# Patient Record
Sex: FEMALE | Race: WHITE | NOT HISPANIC OR LATINO | Employment: OTHER | URBAN - METROPOLITAN AREA
[De-identification: names, ages, dates, MRNs, and addresses within clinical notes are randomized per-mention and may not be internally consistent; named-entity substitution may affect disease eponyms.]

---

## 2023-11-17 ENCOUNTER — TRANSCRIBE ORDERS (OUTPATIENT)
Dept: ADMINISTRATIVE | Facility: HOSPITAL | Age: 88
End: 2023-11-17
Payer: MEDICARE

## 2023-11-17 ENCOUNTER — HOSPITAL ENCOUNTER (OUTPATIENT)
Dept: GENERAL RADIOLOGY | Facility: HOSPITAL | Age: 88
Discharge: HOME | End: 2023-11-17
Admitting: FAMILY MEDICINE
Payer: MEDICARE

## 2023-11-17 DIAGNOSIS — M54.6 ACUTE LEFT-SIDED THORACIC BACK PAIN: Primary | ICD-10-CM

## 2023-11-17 PROCEDURE — 72070 X-RAY EXAM THORAC SPINE 2VWS: CPT

## 2024-07-28 ENCOUNTER — APPOINTMENT (OUTPATIENT)
Dept: MRI IMAGING | Facility: HOSPITAL | Age: 89
End: 2024-07-28
Payer: MEDICARE

## 2024-07-28 ENCOUNTER — APPOINTMENT (OUTPATIENT)
Dept: CARDIOLOGY | Facility: HOSPITAL | Age: 89
End: 2024-07-28
Payer: MEDICARE

## 2024-07-28 ENCOUNTER — HOSPITAL ENCOUNTER (OUTPATIENT)
Facility: HOSPITAL | Age: 89
Setting detail: OBSERVATION
Discharge: HOME | End: 2024-07-29
Attending: FAMILY MEDICINE | Admitting: STUDENT IN AN ORGANIZED HEALTH CARE EDUCATION/TRAINING PROGRAM
Payer: MEDICARE

## 2024-07-28 ENCOUNTER — APPOINTMENT (OUTPATIENT)
Dept: OTHER | Facility: HOSPITAL | Age: 89
End: 2024-07-28
Payer: MEDICARE

## 2024-07-28 DIAGNOSIS — G45.9 TRANSIENT ISCHEMIC ATTACK (TIA): Primary | ICD-10-CM

## 2024-07-28 PROBLEM — R42 DIZZINESS: Status: ACTIVE | Noted: 2024-07-28

## 2024-07-28 PROBLEM — D53.9 MACROCYTIC ANEMIA: Status: ACTIVE | Noted: 2024-07-28

## 2024-07-28 PROBLEM — Z87.81 HISTORY OF FRACTURE OF RIGHT HIP: Status: ACTIVE | Noted: 2024-07-28

## 2024-07-28 PROBLEM — E55.9 VITAMIN D DEFICIENCY: Status: ACTIVE | Noted: 2024-07-28

## 2024-07-28 PROBLEM — I10 PRIMARY HYPERTENSION: Status: ACTIVE | Noted: 2024-07-28

## 2024-07-28 PROBLEM — K21.9 GERD WITHOUT ESOPHAGITIS: Status: ACTIVE | Noted: 2024-07-28

## 2024-07-28 LAB
BH CV ECHO MEAS - AO MAX PG: 2.43 MMHG
BH CV ECHO MEAS - AO MEAN PG: 1 MMHG
BH CV ECHO MEAS - AO ROOT DIAM: 2.3 CM
BH CV ECHO MEAS - AO V2 MAX: 78 CM/SEC
BH CV ECHO MEAS - AO V2 VTI: 19.1 CM
BH CV ECHO MEAS - AVA(I,D): 2.6 CM2
BH CV ECHO MEAS - EDV(CUBED): 59.3 ML
BH CV ECHO MEAS - EDV(MOD-SP2): 45.4 ML
BH CV ECHO MEAS - EDV(MOD-SP4): 68.6 ML
BH CV ECHO MEAS - EF(MOD-BP): 56.3 %
BH CV ECHO MEAS - EF(MOD-SP2): 56.4 %
BH CV ECHO MEAS - EF(MOD-SP4): 56.9 %
BH CV ECHO MEAS - ESV(CUBED): 19.7 ML
BH CV ECHO MEAS - ESV(MOD-SP2): 19.8 ML
BH CV ECHO MEAS - ESV(MOD-SP4): 29.6 ML
BH CV ECHO MEAS - FS: 30.8 %
BH CV ECHO MEAS - IVS/LVPW: 1 CM
BH CV ECHO MEAS - IVSD: 0.9 CM
BH CV ECHO MEAS - LA DIMENSION: 3.1 CM
BH CV ECHO MEAS - LAT PEAK E' VEL: 13.3 CM/SEC
BH CV ECHO MEAS - LV MASS(C)D: 105.3 GRAMS
BH CV ECHO MEAS - LV MAX PG: 2.07 MMHG
BH CV ECHO MEAS - LV MEAN PG: 1 MMHG
BH CV ECHO MEAS - LV V1 MAX: 72 CM/SEC
BH CV ECHO MEAS - LV V1 VTI: 15.9 CM
BH CV ECHO MEAS - LVIDD: 3.9 CM
BH CV ECHO MEAS - LVIDS: 2.7 CM
BH CV ECHO MEAS - LVOT AREA: 3.1 CM2
BH CV ECHO MEAS - LVOT DIAM: 2 CM
BH CV ECHO MEAS - LVPWD: 0.9 CM
BH CV ECHO MEAS - MED PEAK E' VEL: 7.4 CM/SEC
BH CV ECHO MEAS - MV A MAX VEL: 103 CM/SEC
BH CV ECHO MEAS - MV DEC SLOPE: 275 CM/SEC2
BH CV ECHO MEAS - MV DEC TIME: 0.24 SEC
BH CV ECHO MEAS - MV E MAX VEL: 90.3 CM/SEC
BH CV ECHO MEAS - MV E/A: 0.88
BH CV ECHO MEAS - MV MAX PG: 6.2 MMHG
BH CV ECHO MEAS - MV MEAN PG: 2 MMHG
BH CV ECHO MEAS - MV P1/2T: 103.8 MSEC
BH CV ECHO MEAS - MV V2 VTI: 27.3 CM
BH CV ECHO MEAS - MVA(P1/2T): 2.12 CM2
BH CV ECHO MEAS - MVA(VTI): 1.83 CM2
BH CV ECHO MEAS - PA ACC TIME: 0.11 SEC
BH CV ECHO MEAS - SV(LVOT): 50 ML
BH CV ECHO MEAS - SV(MOD-SP2): 25.6 ML
BH CV ECHO MEAS - SV(MOD-SP4): 39 ML
BH CV ECHO MEASUREMENTS AVERAGE E/E' RATIO: 8.72
BH CV XLRA - RV BASE: 3.2 CM
BH CV XLRA - RV LENGTH: 5.9 CM
BH CV XLRA - RV MID: 2.5 CM
BH CV XLRA - TDI S': 10.7 CM/SEC
CHOLEST SERPL-MCNC: 186 MG/DL (ref 0–200)
FOLATE SERPL-MCNC: >20 NG/ML (ref 4.78–24.2)
GLUCOSE BLDC GLUCOMTR-MCNC: 140 MG/DL (ref 70–130)
GLUCOSE BLDC GLUCOMTR-MCNC: 78 MG/DL (ref 70–130)
GLUCOSE BLDC GLUCOMTR-MCNC: 80 MG/DL (ref 70–130)
HBA1C MFR BLD: 5.5 % (ref 4.8–5.6)
HDLC SERPL-MCNC: 62 MG/DL (ref 40–60)
IRON 24H UR-MRATE: 100 MCG/DL (ref 37–145)
IRON SATN MFR SERPL: 33 % (ref 20–50)
LDLC SERPL CALC-MCNC: 113 MG/DL (ref 0–100)
LDLC/HDLC SERPL: 1.82 {RATIO}
LV EF 2D ECHO EST: 60 %
TIBC SERPL-MCNC: 299 MCG/DL (ref 298–536)
TRANSFERRIN SERPL-MCNC: 201 MG/DL (ref 200–360)
TRIGL SERPL-MCNC: 56 MG/DL (ref 0–150)
VIT B12 BLD-MCNC: 309 PG/ML (ref 211–946)
VLDLC SERPL-MCNC: 11 MG/DL (ref 5–40)

## 2024-07-28 PROCEDURE — 84466 ASSAY OF TRANSFERRIN: CPT | Performed by: PEDIATRICS

## 2024-07-28 PROCEDURE — G0378 HOSPITAL OBSERVATION PER HR: HCPCS

## 2024-07-28 PROCEDURE — 83540 ASSAY OF IRON: CPT | Performed by: PEDIATRICS

## 2024-07-28 PROCEDURE — 83036 HEMOGLOBIN GLYCOSYLATED A1C: CPT | Performed by: PEDIATRICS

## 2024-07-28 PROCEDURE — G0379 DIRECT REFER HOSPITAL OBSERV: HCPCS

## 2024-07-28 PROCEDURE — 99204 OFFICE O/P NEW MOD 45 MIN: CPT

## 2024-07-28 PROCEDURE — 82607 VITAMIN B-12: CPT | Performed by: PEDIATRICS

## 2024-07-28 PROCEDURE — 82746 ASSAY OF FOLIC ACID SERUM: CPT | Performed by: PEDIATRICS

## 2024-07-28 PROCEDURE — 93306 TTE W/DOPPLER COMPLETE: CPT | Performed by: INTERNAL MEDICINE

## 2024-07-28 PROCEDURE — 93306 TTE W/DOPPLER COMPLETE: CPT

## 2024-07-28 PROCEDURE — 82948 REAGENT STRIP/BLOOD GLUCOSE: CPT

## 2024-07-28 PROCEDURE — 70551 MRI BRAIN STEM W/O DYE: CPT

## 2024-07-28 PROCEDURE — 80061 LIPID PANEL: CPT | Performed by: PEDIATRICS

## 2024-07-28 PROCEDURE — 92523 SPEECH SOUND LANG COMPREHEN: CPT

## 2024-07-28 PROCEDURE — 99222 1ST HOSP IP/OBS MODERATE 55: CPT | Performed by: PEDIATRICS

## 2024-07-28 RX ORDER — LISINOPRIL 20 MG/1
20 TABLET ORAL DAILY
COMMUNITY
End: 2024-07-29 | Stop reason: HOSPADM

## 2024-07-28 RX ORDER — LATANOPROST 50 UG/ML
1 SOLUTION/ DROPS OPHTHALMIC NIGHTLY
COMMUNITY

## 2024-07-28 RX ORDER — ACETAMINOPHEN 160 MG/5ML
650 SOLUTION ORAL EVERY 4 HOURS PRN
Status: DISCONTINUED | OUTPATIENT
Start: 2024-07-28 | End: 2024-07-29 | Stop reason: HOSPADM

## 2024-07-28 RX ORDER — ASPIRIN 300 MG/1
300 SUPPOSITORY RECTAL DAILY
Status: DISCONTINUED | OUTPATIENT
Start: 2024-07-29 | End: 2024-07-29

## 2024-07-28 RX ORDER — BISACODYL 10 MG
10 SUPPOSITORY, RECTAL RECTAL DAILY PRN
Status: DISCONTINUED | OUTPATIENT
Start: 2024-07-28 | End: 2024-07-29 | Stop reason: HOSPADM

## 2024-07-28 RX ORDER — ASPIRIN 81 MG/1
81 TABLET, CHEWABLE ORAL DAILY
Status: DISCONTINUED | OUTPATIENT
Start: 2024-07-28 | End: 2024-07-28

## 2024-07-28 RX ORDER — ATORVASTATIN CALCIUM 40 MG/1
80 TABLET, FILM COATED ORAL NIGHTLY
Status: DISCONTINUED | OUTPATIENT
Start: 2024-07-28 | End: 2024-07-29 | Stop reason: HOSPADM

## 2024-07-28 RX ORDER — ACETAMINOPHEN 325 MG/1
650 TABLET ORAL EVERY 4 HOURS PRN
Status: DISCONTINUED | OUTPATIENT
Start: 2024-07-28 | End: 2024-07-29 | Stop reason: HOSPADM

## 2024-07-28 RX ORDER — LATANOPROST 50 UG/ML
1 SOLUTION/ DROPS OPHTHALMIC NIGHTLY
Status: DISCONTINUED | OUTPATIENT
Start: 2024-07-28 | End: 2024-07-29 | Stop reason: HOSPADM

## 2024-07-28 RX ORDER — ATORVASTATIN CALCIUM 40 MG/1
80 TABLET, FILM COATED ORAL NIGHTLY
Status: DISCONTINUED | OUTPATIENT
Start: 2024-07-28 | End: 2024-07-28

## 2024-07-28 RX ORDER — SODIUM CHLORIDE 0.9 % (FLUSH) 0.9 %
10 SYRINGE (ML) INJECTION EVERY 12 HOURS SCHEDULED
Status: DISCONTINUED | OUTPATIENT
Start: 2024-07-28 | End: 2024-07-29

## 2024-07-28 RX ORDER — SODIUM CHLORIDE 0.9 % (FLUSH) 0.9 %
10 SYRINGE (ML) INJECTION AS NEEDED
Status: DISCONTINUED | OUTPATIENT
Start: 2024-07-28 | End: 2024-07-29 | Stop reason: HOSPADM

## 2024-07-28 RX ORDER — SODIUM CHLORIDE 9 MG/ML
40 INJECTION, SOLUTION INTRAVENOUS AS NEEDED
Status: DISCONTINUED | OUTPATIENT
Start: 2024-07-28 | End: 2024-07-29 | Stop reason: HOSPADM

## 2024-07-28 RX ORDER — ASPIRIN 81 MG/1
81 TABLET ORAL DAILY
COMMUNITY
End: 2024-07-29 | Stop reason: HOSPADM

## 2024-07-28 RX ORDER — TIMOLOL MALEATE 5 MG/ML
1 SOLUTION/ DROPS OPHTHALMIC DAILY
COMMUNITY

## 2024-07-28 RX ORDER — AMOXICILLIN 250 MG
2 CAPSULE ORAL 2 TIMES DAILY PRN
Status: DISCONTINUED | OUTPATIENT
Start: 2024-07-28 | End: 2024-07-29 | Stop reason: HOSPADM

## 2024-07-28 RX ORDER — ASPIRIN 300 MG/1
300 SUPPOSITORY RECTAL DAILY
Status: DISCONTINUED | OUTPATIENT
Start: 2024-07-28 | End: 2024-07-28

## 2024-07-28 RX ORDER — OMEPRAZOLE 40 MG/1
40 CAPSULE, DELAYED RELEASE ORAL DAILY
COMMUNITY
End: 2024-07-29 | Stop reason: HOSPADM

## 2024-07-28 RX ORDER — BISACODYL 5 MG/1
5 TABLET, DELAYED RELEASE ORAL DAILY PRN
Status: DISCONTINUED | OUTPATIENT
Start: 2024-07-28 | End: 2024-07-29 | Stop reason: HOSPADM

## 2024-07-28 RX ORDER — POLYETHYLENE GLYCOL 3350 17 G/17G
17 POWDER, FOR SOLUTION ORAL DAILY PRN
Status: DISCONTINUED | OUTPATIENT
Start: 2024-07-28 | End: 2024-07-29 | Stop reason: HOSPADM

## 2024-07-28 RX ORDER — ASPIRIN 81 MG/1
81 TABLET, CHEWABLE ORAL DAILY
Status: DISCONTINUED | OUTPATIENT
Start: 2024-07-29 | End: 2024-07-29

## 2024-07-28 RX ORDER — ACETAMINOPHEN 650 MG/1
650 SUPPOSITORY RECTAL EVERY 4 HOURS PRN
Status: DISCONTINUED | OUTPATIENT
Start: 2024-07-28 | End: 2024-07-29 | Stop reason: HOSPADM

## 2024-07-28 RX ORDER — SODIUM CHLORIDE 0.9 % (FLUSH) 0.9 %
10 SYRINGE (ML) INJECTION EVERY 12 HOURS SCHEDULED
Status: DISCONTINUED | OUTPATIENT
Start: 2024-07-28 | End: 2024-07-29 | Stop reason: HOSPADM

## 2024-07-28 RX ADMIN — Medication 10 ML: at 21:06

## 2024-07-28 RX ADMIN — Medication 10 ML: at 10:53

## 2024-07-28 RX ADMIN — ATORVASTATIN CALCIUM 80 MG: 40 TABLET, FILM COATED ORAL at 21:06

## 2024-07-28 RX ADMIN — Medication 10 ML: at 10:54

## 2024-07-28 RX ADMIN — LATANOPROST 1 DROP: 50 SOLUTION OPHTHALMIC at 22:52

## 2024-07-28 NOTE — PLAN OF CARE
Goal Outcome Evaluation:  Plan of Care Reviewed With: patient, family        Progress:  (eval)         Anticipated Discharge Disposition (SLP): home    SLP Diagnosis: functional speech/language skills, functional cognitive-linguistic skills (07/28/24 1202)

## 2024-07-28 NOTE — H&P
[facility] Medicine Services  HISTORY AND PHYSICAL    Patient Name: Wendi Ravi  : 1933  MRN: [id number]  Primary Care Physician: Emily Smith MD  Date of admission: 2024      Subjective   Subjective     Chief Complaint:  dizziness    HPI:  Wendi Ravi is a 89 y.o. female with a history of hypertension and GERD who presents with acute onset of dizziness and lower extremity heaviness.  Patient lives with her son and went to bed feeling well last night.  Woke up around 130 and when she went to get up felt dizzy and actually fell backwards, with no loss of consciousness.  At the time also was having some bilateral heaviness of her legs worse on the left.  Went to [place], provide initial workup, CT scan of the head was negative.  Stroke neurology was consulted and recommended transfer here.    Currently patient is continuing to have some mild dizziness and has had improvement in her lower extremity heaviness.  She otherwise has no other complaints.  She uses a walker at home and is otherwise fairly active  With minimal chronic medical problems.      Personal History     Past Medical History:   Diagnosis Date    Arthritis     Cancer     GERD (gastroesophageal reflux disease)     Hypertension            Past Surgical History:   Procedure Laterality Date    APPENDECTOMY      COLON SURGERY      COLONOSCOPY      EYE SURGERY      FRACTURE SURGERY      HIP FRACTURE SURGERY Right     HYSTERECTOMY      SKIN BIOPSY         Family History: family history includes Cancer in her brother, daughter, mother, sister, and son; Diabetes in her father and sister; Heart disease in her father; No Known Problems in her maternal aunt, maternal grandfather, maternal grandmother, maternal uncle, paternal aunt, paternal grandfather, paternal grandmother, paternal uncle, and another family member.     Social History:  reports that she has never smoked. She has never used smokeless tobacco.  She reports that she does not drink alcohol and does not use drugs.  Social History     Social History Narrative    Lives with son.  Uses walker       Medications:  Available home medication information reviewed.       No Known Allergies    Objective   Objective     Vital Signs:   Temp:  [97.8 °F (36.6 °C)] 97.8 °F (36.6 °C)  Heart Rate:  [64] 64  Resp:  [16] 16  BP: (143-193)/(72-91) 143/72  Total (NIH Stroke Scale): 1    Physical Exam   Constitutional: Awake, alert  Eyes: PERRLA, sclerae anicteric, no conjunctival injection  HENT: NCAT, mucous membranes moist  Neck: Supple, no thyromegaly, no lymphadenopathy, trachea midline  Respiratory: Clear to auscultation bilaterally, nonlabored respirations   Cardiovascular: RRR, no murmurs, rubs, or gallops, palpable pedal pulses bilaterally  Gastrointestinal: Positive bowel sounds, soft, nontender, nondistended  Musculoskeletal: No bilateral ankle edema, no clubbing or cyanosis to extremities  Psychiatric: Appropriate affect, cooperative  Neurologic: Oriented x 3, strength symmetric in all extremities, Cranial Nerves grossly intact to confrontation, speech clear  Skin: No rashes      Result Review:  I have personally reviewed the results from the time of this admission to 7/28/2024 14:33 EDT and agree with these findings:  [x]  Laboratory list / accordion  []  Microbiology  [x]  Radiology  [x]  EKG/Telemetry   []  Cardiology/Vascular   []  Pathology  [x]  Old records  []  Other:        LAB RESULTS:          Lab 07/28/24  1033   HEMOGLOBIN A1C 5.50                 Lab 07/28/24  1032   CHOLESTEROL 186   LDL CHOL 113*   HDL CHOL 62*   TRIGLYCERIDES 56         Lab 07/28/24  1032   IRON 100   IRON SATURATION (TSAT) 33   TIBC 299   TRANSFERRIN 201             Microbiology Results (last 10 days)       ** No results found for the last 240 hours. **            Adult Transthoracic Echo Complete W/ Cont if Necessary Per Protocol (With Agitated Saline)    Result Date: 7/28/2024     Left ventricular systolic function is normal. Estimated left ventricular EF = 60%   No hemodynamically significant valvular heart disease     CT Outside Films    Result Date: 2024  This procedure was auto-finalized with no dictation required.    CT Head Without Contrast    Result Date: 2024  [facility] [address] [place] [address] Phone: [phone] Fax: [phone] Name: GHISLAINE STEVENS Exam Date: 2024 : 1933 Age 89 years Gender: F Accession: [id number] MRN: [id number] Physician: ALMA PATEL Facility: [facility] Facility HSV: Outpatient Exam: CT BRAIN W/O (STROKE PROTOCOL) FINAL REPORT TECHNIQUE: null CLINICAL HISTORY: Numbness Left Side COMPARISON: null FINDINGS: CT BRAIN WITHOUT CONTRAST ß¦COMPARISON: None. ß¦FINDINGS: There is mild parenchymal atrophy. There is no evidence of an acute infarct or intraparenchymal hemorrhage. Remote infarction is noted within the left occipital lobe. Patchy areas of low attenuation are noted in the subcortical and periventricular regions of the supratentorial brain which are nonspecific but most likely represent chronic small vessel ischemic disease. There is no mass effect, midline shift, or extra-axial blood. The ventricles are normal in size without evidence of hydrocephalus. The bone windows are unremarkable. There is opacification of the left sphenoid sinus. IMPRESSION: IMPRESSION: 1. No acute disease in the brain. 2. Left sphenoid sinus disease is noted. Authenticated and Electronically Signed by Cy Melton MD on 2024 04:59:47 AMEASTERN Dictated By:  Cy Melton Transcribed By: Transcribed On: 2024 4:59 AM Electronically signed by: Cy Melton 2024 Thank you for referring GHISLAINE STEVENS to [facility]. Legally authenticated by NOA MEJIAS 2024 04:59:47     Results for orders placed during the hospital encounter of 24    Adult Transthoracic Echo Complete W/ Cont if  Necessary Per Protocol (With Agitated Saline)    Interpretation Summary    Left ventricular systolic function is normal. Estimated left ventricular EF = 60%    No hemodynamically significant valvular heart disease      Assessment & Plan   Assessment & Plan       Dizziness    Primary hypertension    GERD without esophagitis    Macrocytic anemia    History of fracture of right hip    Vitamin D deficiency    Wendi Ravi is a 89 y.o. F with PMHx of HTN, GERD, glaucoma and recent R hip fx in May, presents with acute onset of dizziness with concern for possible stroke.    Vertigo:  --Stroke neurology following.  --MRI pending.  --FLP and A1C WNL  --s/p ASA.  --PT/OT/SLP  --monitor on tele    HTN  --BP elevated, but with permissive hypertension for first 24hrs.    GERD  --cont PPI    Macrocytic anemia  --Send off iron studies, folate, and vit B12 levels    Vit D def:  --noted on old labs.  Repeat level here      VTE Prophylaxis:  Mechanical VTE prophylaxis orders are present.      CODE STATUS:    Code Status and Medical Interventions: CPR (Attempt to Resuscitate); Full Support   Ordered at: 07/28/24 0925     Level Of Support Discussed With:    Patient     Code Status (Patient has no pulse and is not breathing):    CPR (Attempt to Resuscitate)     Medical Interventions (Patient has pulse or is breathing):    Full Support       Expected Discharge   Expected Discharge Date: 7/29/2024; Expected Discharge Time:      Carissa Tee MD  07/28/24

## 2024-07-28 NOTE — CONSULTS
Stroke Consult Note    Patient Name: Wendi Ravi   MRN: [id number]  Age: 89 y.o.  Sex: female  : 1933    Primary Care Physician: Emily Smith MD  Referring Physician: Dave Goldberge MD    TIME STROKE TEAM CALLED: 0506 EST     TIME PATIENT SEEN: 1100 EST    Handedness: Right   Race:     Chief Complaint/Reason for Consultation: Left lower extremity weakness, vertigo, blurry vision    HPI:   This is Ms. Wendi Falcon 89-year-old female with significant health diagnosis for HTN, prior CVA, right femur fracture who presented to [facility] for acute onset of vertigo, blurry vision, left lower extremity weakness.  Patient was not a candidate for IV thrombolytic therapy secondary to last known well 9:30 PM before she went to sleep, woke up at 1:30 AM with symptoms.  CT head without contrast negative for acute abnormality, CTA pending per ED physician images will be uploaded to CD and sent with patient and they will power shared the images with us.  Patient given 325 aspirin prior to her transfer to our facility.  NIH reported as 3 for weakness LLE, bilateral blurry vision, GCS 15, vital stables reported initial blood pressure 202/93 received 1 dose of hydralazine brought her blood pressure down to 169/80, heart rate 65 normal sinus rhythm, respirations 17 satting 92% on room air.    Patient was examined upon arrival to [facility].  NIH 1 on my exam due to partial hemianopia of right upper quadrant visual field.  No current weakness or numbness outside of baseline.  No reported dizziness or headache.  Patient states that at approximately 1:30 AM she sat up from bed very quickly and felt more dizzy at that time slightly lightheaded. She noticed what she thought was left leg weakness when standing soon after, but symptoms have now resolved. Patient was unaware of Prior CVA evidenced on CTH. No additional complaints or concerns.     Last Known Normal Date/Time: 2024 AM EST     Review of  Systems   Constitutional:  Positive for diaphoresis. Negative for fatigue and fever.   HENT:  Negative for nosebleeds, trouble swallowing and voice change.    Eyes:  Positive for visual disturbance. Negative for photophobia.   Respiratory:  Negative for shortness of breath.    Cardiovascular:  Negative for chest pain and palpitations.   Gastrointestinal:  Negative for blood in stool, nausea and vomiting.   Genitourinary: Negative.    Musculoskeletal:  Positive for gait problem.   Neurological:  Positive for dizziness, weakness and light-headedness. Negative for tremors, seizures, syncope, facial asymmetry, speech difficulty, numbness and headaches.   Hematological:  Does not bruise/bleed easily.   Psychiatric/Behavioral: Negative.        No past medical history on file.  No past surgical history on file.  No family history on file.  Social History     Socioeconomic History    Marital status:      Not on File  Prior to Admission medications    Not on File         BP: ()/()   Arterial Line BP: ()/()   Neurological Exam  Mental Status  Alert. Oriented to person, place and time. Oriented to person, place, and time. Speech is normal. Language is fluent with no aphasia.    Cranial Nerves  CN II: Right superior quadrantanopsia. Left normal visual field.  CN III, IV, VI: Extraocular movements intact bilaterally. Normal lids and orbits bilaterally. Pupils equal round and reactive to light bilaterally.  CN V: Facial sensation is normal.  CN VII: Full and symmetric facial movement.  CN XII: Tongue midline without atrophy or fasciculations.    Motor  Normal muscle bulk throughout. No fasciculations present. Normal muscle tone.  At least 4 out of 5 strength to all extremities.  No drift appreciated.    Sensory  Light touch is normal in upper and lower extremities.     Coordination  Right: Finger-to-nose normal.Left: Finger-to-nose normal.    Gait    Not observed.      Physical Exam  HENT:      Head: Normocephalic and  atraumatic.      Mouth/Throat:      Pharynx: Oropharynx is clear.   Eyes:      General: Lids are normal.      Extraocular Movements: Extraocular movements intact.      Pupils: Pupils are equal, round, and reactive to light.   Cardiovascular:      Rate and Rhythm: Normal rate.   Pulmonary:      Effort: Pulmonary effort is normal. No respiratory distress.   Musculoskeletal:      Right lower leg: No edema.      Left lower leg: No edema.   Skin:     General: Skin is warm.      Findings: No lesion.   Neurological:      Mental Status: She is alert and oriented to person, place, and time.      Cranial Nerves: Cranial nerve deficit present.      Sensory: No sensory deficit.      Motor: No weakness.      Coordination: Coordination normal.   Psychiatric:         Mood and Affect: Mood normal.         Speech: Speech normal.         Acute Stroke Data    Thrombolytic Inclusion / Exclusion Criteria    Time: 06:27 EDT  Person Administering Scale: DAVID Sanz    Inclusion Criteria  [x]   18 years of age or greater   []   Onset of symptoms < 4.5 hours before beginning treatment (stroke onset = time patient was last seen well or without symptoms).   []   Diagnosis of acute ischemic stroke causing measurable disabling deficit (Complete Hemianopia, Any Aphasia, Visual or Sensory Extinction, Any weakness limiting sustained effort against gravity)   []   Any remaining deficit considered potentially disabling in view of patient and practitioner   Exclusion criteria (Do not proceed with Alteplase if any are checked under exclusion criteria)  [x]   Onset unknown or GREATER than 4.5 hours   []   ICH on CT/MRI   []   CT demonstrates hypodensity representing acute or subacute infarct   []   Significant head trauma or prior stroke in the previous 3 months   []   Symptoms suggestive of subarachnoid hemorrhage   []   History of un-ruptured intracranial aneurysm GREATER than 10 mm   []   Recent intracranial or intraspinal surgery  within the last 3 months   []   Arterial puncture at a non-compressible site in the previous 7 days   []   Active internal bleeding   []   Acute bleeding tendency   []   Platelet count LESS than 100,000 for known hematological diseases such as leukemia, thrombocytopenia or chronic cirrhosis   []   Current use of anticoagulant with INR GREATER than 1.7 or PT GREATER than 15 seconds, aPTT GREATER than 40 seconds   []   Heparin received within 48 hours, resulting in abnormally elevated aPTT GREATER than upper limit of normal   []   Current use of direct thrombin inhibitors or direct factor Xa inhibitors in the past 48 hours   []   Elevated blood pressure refractory to treatment (systolic GREATER than 185 mm/Hg or diastolic  GREATER than 110 mm/Hg   []   Suspected infective endocarditis and aortic arch dissection   []   Current use of therapeutic treatment dose of low-molecular-weight heparin (LMWH) within the previous 24 hours   []   Structural GI malignancy or bleed   Relative exclusion for all patients  []   Only minor non-disabling symptoms   []   Pregnancy   []   Seizure at onset with postictal residual neurological impairments   []   Major surgery or previous trauma within past 14 days   []   History of previous spontaneous ICH, intracranial neoplasm, or AV malformation   []   Postpartum (within previous 14 days)   []   Recent GI or urinary tract hemorrhage (within previous 21 days)   []   Recent acute MI (within previous 3 months)   []   History of un-ruptured intracranial aneurysm LESS than 10 mm   []   History of ruptured intracranial aneurysm   []   Blood glucose LESS than 50 mg/dL (2.7 mmol/L)   []   Dural puncture within the last 7 days   []   Known GREATER than 10 cerebral microbleeds   Additional exclusions for patients with symptoms onset between 3 and 4.5 hours.  []   Age > 80.   []   On any anticoagulants regardless of INR  >>> Warfarin (Coumadin), Heparin, Enoxaparin (Lovenox), fondaparinux (Arixtra),  bivalirudin (Angiomax), Argatroban, dabigatran (Pradaxa), rivaroxaban (Xarelto), or apixaban (Eliquis)   []   Severe stroke (NIHSS > 25).   []   History of BOTH diabetes and previous ischemic stroke.   []   The risks and benefits have been discussed with the patient or family related to the administration of IV thrombolytic therapy for stroke symptoms.   []   I have discussed and reviewed the patient's case and imaging with the attending prior to IV thrombolytic therapy.   na Time IV thrombolytic administered       Hospital Meds:  Scheduled- sodium chloride, 10 mL, Intravenous, Q12H      Infusions-     PRNs-   sodium chloride    sodium chloride    Functional Status Prior to Current Stroke/Avery Score:   MODIFIED TIFFANY SCALE (to be assessed for each patient having history of stroke) []Stroke history but not assessed  []0: No symptoms at all  []1: No significant disability despite symptoms  [x]2: Slight disability  []3: Moderate disability  []4: Moderately severe disability  []5: Severe disability  []6: Death        NIH Stroke Scale  Time: 06:27 EDT  Person Administering Scale: DAVID Sanz    1a  Level of consciousness: 0=alert; keenly responsive   1b. LOC questions:  0=Answers both questions correctly   1c. LOC commands: 0=Performs both tasks correctly   2.  Best Gaze: 0=normal   3.  Visual: 1=Partial hemianopia   4. Facial Palsy: 0=Normal symmetric movement   5a.  Motor left arm: 0=No drift, limb holds 90 (or 45) degrees for full 10 seconds   5b.  Motor right arm: 0=No drift, limb holds 90 (or 45) degrees for full 10 seconds   6a. motor left le=No drift, limb holds 90 (or 45) degrees for full 10 seconds   6b  Motor right le=No drift, limb holds 90 (or 45) degrees for full 10 seconds   7. Limb Ataxia: 0=Absent   8.  Sensory: 0=Normal; no sensory loss   9. Best Language:  0=No aphasia, normal   10. Dysarthria: 0=Normal   11. Extinction and Inattention: 0=No abnormality    Total:   1     CBC  w/diff          5/2/2024    03:55 5/3/2024    05:29 5/5/2024    01:08   CBC w/Diff   WBC 8.98     7.65     7.18       RBC 3.29     2.95     2.73       Hemoglobin 10.6     9.7     8.9       Hematocrit 32.8     30.0     27.5            102     101       MCH 32.2     32.9     32.6       MCHC 32.3     32.3     32.4       RDW 14.0     14.1     14.2       Platelets 140     141     162       Neutrophil Rel % 84.0     67.0     51.0       Immature Granulocyte Rel % 0.0     1.0     1.0       Lymphocyte Rel % 10.0     21.0     29.0       Monocyte Rel % 6.0     8.0     13.0       Eosinophil Rel % 0.0     2.0     5.0       Basophil Rel % 0.0     1.0     1.0         MRI Brain Without Contrast    Result Date: 7/28/2024  Impression: 1. No acute intracranial abnormality. 2. Mild age-related atrophy with presumed sequela of microvascular ischemic changes. Remote left occipital infarct. 3. T1 hyperintense/T2 hypointense opacification of the left sphenoid sinus which may represent inspissated secretions or fungal colonization. Electronically Signed: Inge Gates MD  7/28/2024 2:57 PM EDT  Workstation ID: [id number]      Results Reviewed:  I have personally reviewed current lab, radiology, and data and agree with results.      Assessment/Plan:    This is a 89-year-old white female, right-handed with multiple vascular risk factor who presented to [facility] for acute onsets of left lower extremity weakness , vertigo, vision impairment at 130 this morning was a last known well 9:30 PM 7/27/2024.  Patient is not a candidate for IV thrombolytic therapy secondary to outside the window.  Patient is not a candidate for mechanical thrombectomy    Antiplatelet PTA: None  Anticoagulant PTA: None        # Right superior quadrant visual deficit   # Remote Left Occipital Infarct   # Transient episode of vertigo with left lower extremity weakness  Ddx: TIA\ischemic stroke, stroke recrudescence, metabolic encephalopathy  -TIA\ischemic  without IV thrombolytic therapy stroke order set in place  -CT head, CTA images at the outside hospital pending will upload images  -MRI ordered and pending  -Echo ordered and pending  -A1c and lipid profile ordered and pending  -Patient received 325 aspirin at OSH, continue aspirin 81 mg starting on 7/29/2024  -Systolic blood pressure allow gradual normalization, SBP goals less than 180, avoid hypotension to avoid hypoperfusion   -PT\OT\SLP evaluation  -Case management for final discharge planning  -NIH\neurocheck per protocol  -Patient remain n.p.o. till dysphagia screen passed, if passed okay to start cardiac diet  -Neurology stroke will continue to follow-up    I discussed plan of care with patient, family, outside ED physician, report to Dr. Tarango hospital medicine team.  Neurology stroke will continue to follow-up.  Thank you for letting us participate in patient care.    Geraldo Thrasher PA-C   [facility] Neuro Stroke

## 2024-07-28 NOTE — THERAPY EVALUATION
Acute Care - Speech Language Pathology Initial Evaluation  [facility]  Cognitive-Communication Evaluation       Patient Name: Wendi Ravi  : 1933  MRN: [id number]  Today's Date: 2024               Admit Date: 2024     Visit Dx:  No diagnosis found.  Patient Active Problem List   Diagnosis    Dizziness    Primary hypertension    GERD without esophagitis    Macrocytic anemia    History of fracture of right hip    Vitamin D deficiency     Past Medical History:   Diagnosis Date    Arthritis     Cancer     GERD (gastroesophageal reflux disease)     Hypertension      Past Surgical History:   Procedure Laterality Date    APPENDECTOMY      COLON SURGERY      COLONOSCOPY      EYE SURGERY      FRACTURE SURGERY      HIP FRACTURE SURGERY Right     HYSTERECTOMY      SKIN BIOPSY         SLP Recommendation and Plan  SLP Diagnosis: functional speech/language skills, functional cognitive-linguistic skills (24 1330)              [facility] [place] Criteria for Skilled Therapy Interventions Met: baseline status (24 1330)  Anticipated Discharge Disposition (SLP): home (24 1330)        Therapy Frequency (SLP SLC): evaluation only (24 1330)                                Plan of Care Reviewed With: patient, family (24 1356)  Progress:  (eval) (24 1356)      SLP EVALUATION (Last 72 Hours)       SLP SLC Evaluation       Row Name 24 8381                   Communication Assessment/Intervention    Document Type evaluation  -AV        Subjective Information no complaints  -AV        Patient Observations alert;cooperative  -AV        Patient/Family/Caregiver Comments/Observations family  -AV        Patient Effort good  -AV           General Information    Patient Profile Reviewed yes  -AV        Precautions/Limitations, Vision WFL  -AV        Precautions/Limitations, Hearing WFL  -AV        Prior Level of Function-Communication WFL  -AV        Plans/Goals Discussed with patient;family  -AV         Barriers to Rehab none identified  -AV        Patient's Goals for Discharge return to home  -AV        Family Goals for Discharge patient able to return to previous activities/roles  -AV           Pain    Additional Documentation Pain Scale: FACES Pre/Post-Treatment (Group)  -AV           Pain Scale: FACES Pre/Post-Treatment    Pain: FACES Scale, Pretreatment 0-->no hurt  -AV        Posttreatment Pain Rating 0-->no hurt  -AV           Comprehension Assessment/Intervention    Comprehension Assessment/Intervention Auditory Comprehension;Reading Comprehension  -AV           Auditory Comprehension Assessment/Intervention    Auditory Comprehension (Communication) WFL  -AV           Reading Comprehension Assessment/Intervention    Reading Comprehension (Communication) WFL  -AV           Expression Assessment/Intervention    Expression Assessment/Intervention verbal expression;graphic expression  -AV           Verbal Expression Assessment/Intervention    Verbal Expression WFL  -AV           Graphic Expression Assessment/Intervention    Graphic Expression WFL  -AV           Oral Motor Structure and Function    Oral Motor Structure and Function WF  -AV        Dentition Assessment natural, present and adequate  -AV        Mucosal Quality moist, healthy  -AV           Oral Musculature and Cranial Nerve Assessment    Oral Motor General Assessment WFL  -AV           Motor Speech Assessment/Intervention    Motor Speech Function [facility]  -AV           Cursory Voice Assessment/Intervention    Quality and Resonance (Voice) WFL  -AV           Cognitive Assessment Intervention- SLP    Cognitive Function (Cognition) [facility]  -AV           SLP Evaluation Clinical Impressions    SLP Diagnosis functional speech/language skills;functional cognitive-linguistic skills  -AV        Rehab Potential/Prognosis good  -AV        SLC Criteria for Skilled Therapy Interventions Met baseline status  -AV        Functional Impact no impact on function  -AV            Recommendations    Therapy Frequency (SLP SLC) evaluation only  -AV        Anticipated Discharge Disposition (SLP) home  -AV                  User Key  (r) = Recorded By, (t) = Taken By, (c) = Cosigned By      Initials Name Effective Dates    Alia Sandy MS CCC-SLP 06/16/21 -                        EDUCATION  The patient has been educated in the following areas:     Cognitive Impairment Communication Impairment.                        Time Calculation:      Time Calculation- SLP       Row Name 07/28/24 1357             Time Calculation- SLP    SLP Start Time 1330  -AV      SLP Received On 07/28/24  -AV         Untimed Charges    SLP Eval/Re-eval  ST Eval Speech and Production w/ Language - 02920  -AV      [id number] Eval Speech and Production w/ Language Minutes 38  -AV         Total Minutes    Untimed Charges Total Minutes 38  -AV       Total Minutes 38  -AV                User Key  (r) = Recorded By, (t) = Taken By, (c) = Cosigned By      Initials Name Provider Type    Alia Sandy MS CCC-SLP Speech and Language Pathologist                    Therapy Charges for Today       Code Description Service Date Service Provider Modifiers Qty    [id number] HC ST EVAL SPEECH AND PROD W LANG  3 7/28/2024 Alia Kerr MS CCC-SLP GN 1                       Alia Nunez MS CCC-SLP  7/28/2024

## 2024-07-29 VITALS
DIASTOLIC BLOOD PRESSURE: 77 MMHG | RESPIRATION RATE: 16 BRPM | BODY MASS INDEX: 19.81 KG/M2 | SYSTOLIC BLOOD PRESSURE: 120 MMHG | HEART RATE: 89 BPM | TEMPERATURE: 98.8 F | WEIGHT: 100.9 LBS | HEIGHT: 60 IN | OXYGEN SATURATION: 97 %

## 2024-07-29 PROBLEM — R42 DIZZINESS: Status: RESOLVED | Noted: 2024-07-28 | Resolved: 2024-07-29

## 2024-07-29 LAB
25(OH)D3 SERPL-MCNC: 34.8 NG/ML (ref 30–100)
ANION GAP SERPL CALCULATED.3IONS-SCNC: 6 MMOL/L (ref 5–15)
BASOPHILS # BLD AUTO: 0.04 10*3/MM3 (ref 0–0.2)
BASOPHILS NFR BLD AUTO: 0.6 % (ref 0–1.5)
BUN SERPL-MCNC: 26 MG/DL (ref 8–23)
BUN/CREAT SERPL: 26.8 (ref 7–25)
CALCIUM SPEC-SCNC: 8.7 MG/DL (ref 8.2–9.6)
CHLORIDE SERPL-SCNC: 106 MMOL/L (ref 98–107)
CO2 SERPL-SCNC: 28 MMOL/L (ref 22–29)
CREAT SERPL-MCNC: 0.97 MG/DL (ref 0.57–1)
DEPRECATED RDW RBC AUTO: 48.3 FL (ref 37–54)
EGFRCR SERPLBLD CKD-EPI 2021: 55.3 ML/MIN/1.73
EOSINOPHIL # BLD AUTO: 0.17 10*3/MM3 (ref 0–0.4)
EOSINOPHIL NFR BLD AUTO: 2.7 % (ref 0.3–6.2)
ERYTHROCYTE [DISTWIDTH] IN BLOOD BY AUTOMATED COUNT: 13.1 % (ref 12.3–15.4)
GLUCOSE SERPL-MCNC: 92 MG/DL (ref 65–99)
HCT VFR BLD AUTO: 36.2 % (ref 34–46.6)
HGB BLD-MCNC: 11.7 G/DL (ref 12–15.9)
IMM GRANULOCYTES # BLD AUTO: 0.02 10*3/MM3 (ref 0–0.05)
IMM GRANULOCYTES NFR BLD AUTO: 0.3 % (ref 0–0.5)
LYMPHOCYTES # BLD AUTO: 1.61 10*3/MM3 (ref 0.7–3.1)
LYMPHOCYTES NFR BLD AUTO: 25.4 % (ref 19.6–45.3)
MCH RBC QN AUTO: 32.5 PG (ref 26.6–33)
MCHC RBC AUTO-ENTMCNC: 32.3 G/DL (ref 31.5–35.7)
MCV RBC AUTO: 100.6 FL (ref 79–97)
MONOCYTES # BLD AUTO: 0.62 10*3/MM3 (ref 0.1–0.9)
MONOCYTES NFR BLD AUTO: 9.8 % (ref 5–12)
NEUTROPHILS NFR BLD AUTO: 3.89 10*3/MM3 (ref 1.7–7)
NEUTROPHILS NFR BLD AUTO: 61.2 % (ref 42.7–76)
NRBC BLD AUTO-RTO: 0 /100 WBC (ref 0–0.2)
PLATELET # BLD AUTO: 182 10*3/MM3 (ref 140–450)
PMV BLD AUTO: 10.5 FL (ref 6–12)
POTASSIUM SERPL-SCNC: 4.5 MMOL/L (ref 3.5–5.2)
RBC # BLD AUTO: 3.6 10*6/MM3 (ref 3.77–5.28)
SODIUM SERPL-SCNC: 140 MMOL/L (ref 136–145)
WBC NRBC COR # BLD AUTO: 6.35 10*3/MM3 (ref 3.4–10.8)

## 2024-07-29 PROCEDURE — 80048 BASIC METABOLIC PNL TOTAL CA: CPT | Performed by: PEDIATRICS

## 2024-07-29 PROCEDURE — 99214 OFFICE O/P EST MOD 30 MIN: CPT | Performed by: STUDENT IN AN ORGANIZED HEALTH CARE EDUCATION/TRAINING PROGRAM

## 2024-07-29 PROCEDURE — 97161 PT EVAL LOW COMPLEX 20 MIN: CPT

## 2024-07-29 PROCEDURE — 85025 COMPLETE CBC W/AUTO DIFF WBC: CPT | Performed by: PEDIATRICS

## 2024-07-29 PROCEDURE — 97165 OT EVAL LOW COMPLEX 30 MIN: CPT

## 2024-07-29 PROCEDURE — 82306 VITAMIN D 25 HYDROXY: CPT | Performed by: PEDIATRICS

## 2024-07-29 PROCEDURE — 99239 HOSP IP/OBS DSCHRG MGMT >30: CPT | Performed by: STUDENT IN AN ORGANIZED HEALTH CARE EDUCATION/TRAINING PROGRAM

## 2024-07-29 PROCEDURE — G0378 HOSPITAL OBSERVATION PER HR: HCPCS

## 2024-07-29 RX ORDER — ATORVASTATIN CALCIUM 80 MG/1
80 TABLET, FILM COATED ORAL NIGHTLY
Qty: 90 TABLET | Refills: 0 | Status: SHIPPED | OUTPATIENT
Start: 2024-07-29

## 2024-07-29 RX ORDER — ASPIRIN 325 MG
325 TABLET, DELAYED RELEASE (ENTERIC COATED) ORAL DAILY
Qty: 90 TABLET | Refills: 0 | Status: SHIPPED | OUTPATIENT
Start: 2024-07-30

## 2024-07-29 RX ORDER — TIMOLOL MALEATE 5 MG/ML
1 SOLUTION/ DROPS OPHTHALMIC DAILY
Status: DISCONTINUED | OUTPATIENT
Start: 2024-07-29 | End: 2024-07-29 | Stop reason: HOSPADM

## 2024-07-29 RX ORDER — ASPIRIN 81 MG/1
324 TABLET, CHEWABLE ORAL DAILY
Status: DISCONTINUED | OUTPATIENT
Start: 2024-07-30 | End: 2024-07-29

## 2024-07-29 RX ORDER — ASPIRIN 325 MG
325 TABLET, DELAYED RELEASE (ENTERIC COATED) ORAL DAILY
Status: DISCONTINUED | OUTPATIENT
Start: 2024-07-29 | End: 2024-07-29 | Stop reason: HOSPADM

## 2024-07-29 RX ADMIN — Medication 10 ML: at 09:19

## 2024-07-29 RX ADMIN — ASPIRIN 325 MG: 325 TABLET, COATED ORAL at 10:02

## 2024-07-29 NOTE — THERAPY EVALUATION
Patient Name: Wendi Ravi  : 1933    MRN: [id number]                              Today's Date: 2024       Admit Date: 2024    Visit Dx:     ICD-10-CM ICD-9-CM   1. Transient ischemic attack (TIA)  G45.9 435.9     Patient Active Problem List   Diagnosis    Dizziness    Primary hypertension    GERD without esophagitis    Macrocytic anemia    History of fracture of right hip    Vitamin D deficiency     Past Medical History:   Diagnosis Date    Arthritis     Cancer     GERD (gastroesophageal reflux disease)     Hypertension      Past Surgical History:   Procedure Laterality Date    APPENDECTOMY      COLON SURGERY      COLONOSCOPY      EYE SURGERY      FRACTURE SURGERY      HIP FRACTURE SURGERY Right     HYSTERECTOMY      SKIN BIOPSY        General Information       Row Name 24 1013          Physical Therapy Time and Intention    Document Type evaluation (P)   -     Mode of Treatment physical therapy (P)   -       Row Name 24 1013          General Information    Patient Profile Reviewed yes (P)   -     Prior Level of Function independent:;all household mobility;community mobility;ADL's;bed mobility (P)   Pt uses rolling walker at baseline. Pt has walk in shower, with grab bars, and shower chair.  -     Existing Precautions/Restrictions fall (P)   -     Barriers to Rehab none identified (P)   -       Row Name 24 1013          Home Main Entrance    Number of Stairs, Main Entrance one;other (see comments) (P)   Ramp to enter with on step at doorway  -     Stair Railings, Main Entrance none (P)   -       Row Name 24 1013          Stairs Within Home, Primary    Number of Stairs, Within Home, Primary none (P)   -       Row Name 24 1013          Cognition    Orientation Status (Cognition) oriented x 4 (P)   -       Row Name 24 1013          Safety Issues, Functional Mobility    Safety Issues Affecting Function (Mobility) awareness of need for  assistance;insight into deficits/self-awareness (P)   -     Impairments Affecting Function (Mobility) balance;endurance/activity tolerance (P)   -     Comment, Safety Issues/Impairments (Mobility) Alert and following commands (P)   -               User Key  (r) = Recorded By, (t) = Taken By, (c) = Cosigned By      Initials Name Provider Type     William Welch, PT Student PT Student                   Mobility       Row Name 07/29/24 1018          Bed Mobility    Bed Mobility supine-sit;scooting/bridging (P)   -HM     Scooting/Bridging [place] (Bed Mobility) standby assist (P)   -     Supine-Sit [place] (Bed Mobility) standby assist (P)   -     Assistive Device (Bed Mobility) head of bed elevated (P)   -     Comment, (Bed Mobility) Pt able to complete bed mobility without physical assistance or increased time to complete task. No reports of dizziness upon sitting. (P)   -       Row Name 07/29/24 1018          Transfers    Comment, (Transfers) STS x 1 from EOB (P)   -       Row Name 07/29/24 1018          Bed-Chair Transfer    Bed-Chair [place] (Transfers) not tested (P)   -       Row Name 07/29/24 1018          Sit-Stand Transfer    Sit-Stand [place] (Transfers) standby assist (P)   -     Assistive Device (Sit-Stand Transfers) walker, front-wheeled (P)   -     Comment, (Sit-Stand Transfer) Pt pushed frombed to FWW without VC for correction (P)   -       Row Name 07/29/24 1018          Gait/Stairs (Locomotion)    [place] Level (Gait) standby assist (P)   -     Assistive Device (Gait) walker, front-wheeled (P)   -HM     Distance in Feet (Gait) 100 (P)   -HM     Deviations/Abnormal Patterns (Gait) bilateral deviations (P)   -HM     Bilateral Gait Deviations heel strike decreased;forward flexed posture (P)   -     [place] Level (Stairs) stand by assist (P)   -     Assistive Device (Stairs) walker, front-wheeled (P)   -HM     Handrail Location (Stairs) none  (P)   -HM     Number of Steps (Stairs) 1 (P)   -     Ascending Technique (Stairs) step-to-step (P)   -     Descending Technique (Stairs) step-to-step (P)   -     Comment, (Gait/Stairs) Pt ambulated with a step through gait pattern. Pt HR increased to 144 with ambulation but returned to 100 once sitting back in room. Pt with no LOB or postural sway observed. Pt was able to complete stair training simulated to home environment with backwards approach. (P)   -               User Key  (r) = Recorded By, (t) = Taken By, (c) = Cosigned By      Initials Name Provider Type     William Welch, PT Student PT Student                   Obj/Interventions       Row Name 07/29/24 1023          Range of Motion Comprehensive    General Range of Motion bilateral lower extremity ROM WFL (P)   -       Row Name 07/29/24 1023          Strength Comprehensive (MMT)    General Manual Muscle Testing (MMT) Assessment lower extremity strength deficits identified (P)   -     Comment, General Manual Muscle Testing (MMT) Assessment B LE 4+/5 (P)   -       Row Name 07/29/24 1023          Balance    Balance Assessment sitting static balance;sitting dynamic balance;sit to stand dynamic balance;standing static balance;standing dynamic balance (P)   -     Static Sitting Balance independent (P)   -     Dynamic Sitting Balance independent (P)   -     Position, Sitting Balance unsupported;sitting edge of bed (P)   -     Sit to Stand Dynamic Balance standby assist (P)   -     Static Standing Balance standby assist (P)   -     Dynamic Standing Balance standby assist (P)   -     Position/Device Used, Standing Balance walker, front-wheeled (P)   -     Balance Interventions sitting;sit to stand;standing;occupation based/functional task (P)   -     Comment, Balance No overt LOB (P)   -       Row Name 07/29/24 1023          Sensory Assessment (Somatosensory)    Sensory Assessment (Somatosensory) LE sensation intact (P)    -HM               User Key  (r) = Recorded By, (t) = Taken By, (c) = Cosigned By      Initials Name Provider Type     William Welch, PT Student PT Student                   Goals/Plan       Row Name 07/29/24 1027          Bed Mobility Goal 1 (PT)    Activity/Assistive Device (Bed Mobility Goal 1, PT) sit to supine/supine to sit (P)   -HM     [place] Level/Cues Needed (Bed Mobility Goal 1, PT) independent (P)   -HM     Time Frame (Bed Mobility Goal 1, PT) short term goal (STG);3 days (P)   -HM     Progress/Outcomes (Bed Mobility Goal 1, PT) new goal (P)   -       Row Name 07/29/24 1027          Transfer Goal 1 (PT)    Activity/Assistive Device (Transfer Goal 1, PT) sit-to-stand/stand-to-sit;bed-to-chair/chair-to-bed (P)   -HM     [place] Level/Cues Needed (Transfer Goal 1, PT) modified independence (P)   -HM     Time Frame (Transfer Goal 1, PT) long term goal (LTG);10 days (P)   -HM     Progress/Outcome (Transfer Goal 1, PT) new goal (P)   -HM       Row Name 07/29/24 1027          Gait Training Goal 1 (PT)    Activity/Assistive Device (Gait Training Goal 1, PT) gait (walking locomotion);assistive device use (P)   -HM     [place] Level (Gait Training Goal 1, PT) modified independence (P)   -HM     Distance (Gait Training Goal 1, PT) 350' (P)   -HM     Time Frame (Gait Training Goal 1, PT) long term goal (LTG);10 days (P)   -HM     Progress/Outcome (Gait Training Goal 1, PT) new goal (P)   -HM       Row Name 07/29/24 1027          Stairs Goal 1 (PT)    Activity/Assistive Device (Stairs Goal 1, PT) stairs, all skills;ascending stairs;descending stairs;assistive device use (P)   -HM     [place] Level/Cues Needed (Stairs Goal 1, PT) modified independence (P)   -HM     Number of Stairs (Stairs Goal 1, PT) 1 (P)   -HM     Progress/Outcome (Stairs Goal 1, PT) new goal (P)   -HM       Row Name 07/29/24 1027          Therapy Assessment/Plan (PT)    Planned Therapy Interventions (PT) balance  training;bed mobility training;gait training;home exercise program;joint mobilization;lumbar stabilization;manual therapy techniques;motor coordination training;stretching;strengthening;stair training;ROM (range of motion);postural re-education;patient/family education;neuromuscular re-education;transfer training (P)   -               User Key  (r) = Recorded By, (t) = Taken By, (c) = Cosigned By      Initials Name Provider Type     William Welch, PT Student PT Student                   Clinical Impression       Row Name 07/29/24 1025          Pain    Pretreatment Pain Rating 0/10 - no pain (P)   -     Posttreatment Pain Rating 0/10 - no pain (P)   -     Pain Intervention(s) Repositioned;Elevated;Ambulation/increased activity (P)   -[facility] Name 07/29/24 1025          Plan of Care Review    Plan of Care Reviewed With patient;family (P)   -     Outcome Evaluation Pt presents with mobility near baseline, however, pt would continue to benefit from skilled therapy services during admission to prevent functional decline. Pt able to ambulate 100' with FWW and SBA with ability to complete stair training simulated to home environment. PT recommended DC to home with assist. (P)   -       Row Name 07/29/24 1025          Therapy Assessment/Plan (PT)    Patient/Family Therapy Goals Statement (PT) go home (P)   -     Rehab Potential (PT) good, to achieve stated therapy goals (P)   -     Criteria for Skilled Interventions Met (PT) yes;skilled treatment is necessary;meets criteria (P)   -     Therapy Frequency (PT) daily (P)   -     Predicted Duration of Therapy Intervention (PT) 2 days (P)   -       Row Name 07/29/24 1025          Vital Signs    Pre Systolic BP Rehab 129 (P)   -HM     Pre Treatment Diastolic BP 84 (P)   -HM     Post Systolic BP Rehab 142 (P)   -HM     Post Treatment Diastolic BP 77 (P)   -     Pretreatment Heart Rate (beats/min) 97 (P)   -     Intratreatment Heart Rate  (beats/min) 144 (P)   -HM     Posttreatment Heart Rate (beats/min) 100 (P)   -HM     Pre SpO2 (%) 94 (P)   -HM     O2 Delivery Pre Treatment room air (P)   -HM     Post SpO2 (%) 98 (P)   -HM     O2 Delivery Post Treatment room air (P)   -HM     Pre Patient Position Supine (P)   -HM     Intra Patient Position Standing (P)   -HM     Post Patient Position Sitting (P)   -       Row Name 07/29/24 1025          Positioning and Restraints    Pre-Treatment Position in bed (P)   -HM     Post Treatment Position chair (P)   -HM     In Chair notified nsg;reclined;call light within reach;encouraged to call for assist;exit alarm on;with family/caregiver;waffle cushion;legs elevated (P)   -HM               User Key  (r) = Recorded By, (t) = Taken By, (c) = Cosigned By      Initials Name Provider Type     William Welch, PT Student PT Student                   Outcome Measures       Row Name 07/29/24 1028          How much help from another person do you currently need...    Turning from your back to your side while in flat bed without using bedrails? 4 (P)   -HM     Moving from lying on back to sitting on the side of a flat bed without bedrails? 4 (P)   -HM     Moving to and from a bed to a chair (including a wheelchair)? 4 (P)   -HM     Standing up from a chair using your arms (e.g., wheelchair, bedside chair)? 4 (P)   -HM     Climbing 3-5 steps with a railing? 3 (P)   -HM     To walk in hospital room? 4 (P)   -HM     AM-PAC 6 Clicks Score (PT) 23 (P)   -HM     Highest Level of Mobility Goal 7 --> Walk 25 feet or more (P)   -       Row Name 07/29/24 1028          Modified [place] Scale    Pre-Stroke Modified [place] Scale 6 - Unable to determine (UTD) from the medical record documentation (P)   -HM     Modified [place] Scale 0 - No Symptoms at all. (P)   -HM       Row Name 07/29/24 1028          Functional Assessment    Outcome Measure Options AM-PAC 6 Clicks Basic Mobility (PT);Modified [place] (P)   -               User  Key  (r) = Recorded By, (t) = Taken By, (c) = Cosigned By      Initials Name Provider Type     William Welch, PT Student PT Student                                 Physical Therapy Education       Title: PT OT SLP Therapies (In Progress)       Topic: Physical Therapy (In Progress)       Point: Mobility training (Done)       Learning Progress Summary             Patient Acceptance, E, VU by  at 7/29/2024 1029    Comment: Pt educated on importance of mobility during admission   Family Acceptance, E, VU by  at 7/29/2024 1029    Comment: Pt educated on importance of mobility during admission                         Point: Home exercise program (Not Started)       Learner Progress:  Not documented in this visit.              Point: Body mechanics (Done)       Learning Progress Summary             Patient Acceptance, E, VU by  at 7/29/2024 1029    Comment: Pt educated on importance of mobility during admission   Family Acceptance, E, VU by  at 7/29/2024 1029    Comment: Pt educated on importance of mobility during admission                         Point: Precautions (Done)       Learning Progress Summary             Patient Acceptance, E, VU by  at 7/29/2024 1029    Comment: Pt educated on importance of mobility during admission   Family Acceptance, E, VU by  at 7/29/2024 1029    Comment: Pt educated on importance of mobility during admission                                         User Key       Initials Effective Dates Name Provider Type [facility] hospital 05/07/24 -  William Welch, PT Student PT Student PT                  PT Recommendation and Plan  Planned Therapy Interventions (PT): (P) balance training, bed mobility training, gait training, home exercise program, joint mobilization, lumbar stabilization, manual therapy techniques, motor coordination training, stretching, strengthening, stair training, ROM (range of motion), postural re-education, patient/family education, neuromuscular  re-education, transfer training  Plan of Care Reviewed With: (P) patient, family  Outcome Evaluation: (P) Pt presents with mobility near baseline, however, pt would continue to benefit from skilled therapy services during admission to prevent functional decline. Pt able to ambulate 100' with FWW and SBA with ability to complete stair training simulated to home environment. PT recommended DC to home with assist.     Time Calculation:   PT Evaluation Complexity  History, PT Evaluation Complexity: (P) 1-2 personal factors and/or comorbidities  Examination of Body Systems (PT Eval Complexity): (P) total of 3 or more elements  Clinical Presentation (PT Evaluation Complexity): (P) stable  Clinical Decision Making (PT Evaluation Complexity): (P) low complexity  Overall Complexity (PT Evaluation Complexity): (P) low complexity     PT Charges       Row Name 07/29/24 0946             Time Calculation    Start Time 0946 (P)   -      PT Received On 07/29/24 (P)   -      PT Goal Re-Cert Due Date 08/08/24 (P)   -HM         Untimed Charges    PT Eval/Re-eval Minutes 50 (P)   -HM         Total Minutes    Untimed Charges Total Minutes 50 (P)   -HM       Total Minutes 50 (P)   -                User Key  (r) = Recorded By, (t) = Taken By, (c) = Cosigned By      Initials Name Provider Type     William Welch, PT Student PT Student                  Therapy Charges for Today       Code Description Service Date Service Provider Modifiers Qty    [id number]  PT EVAL LOW COMPLEXITY 4 7/29/2024 William Welch, PT Student GP 1            PT G-Codes  Outcome Measure Options: (P) AM-PAC 6 Clicks Basic Mobility (PT), Modified [place]  AM-PAC 6 Clicks Score (PT): (P) 23  Modified [place] Scale: (P) 0 - No Symptoms at all.  PT Discharge Summary  Anticipated Discharge Disposition (PT): (P) home with assist    William Welch PT Student  7/29/2024

## 2024-07-29 NOTE — DISCHARGE SUMMARY
[facility] Medicine Services  DISCHARGE SUMMARY    Patient Name: Wendi Ravi  : 1933  MRN: [id number]    Date of Admission: 2024  7:46 AM  Date of Discharge:  2024  Primary Care Physician: Emily Smith MD    Consults       Date and Time Order Name Status Description    2024  9:25 AM Inpatient Neurology Consult Stroke              Hospital Course     Presenting Problem: dizziness    Active Hospital Problems    Diagnosis  POA    Primary hypertension [I10]  Yes    GERD without esophagitis [K21.9]  Yes    Macrocytic anemia [D53.9]  Yes    History of fracture of right hip [Z87.81]  Not Applicable    Vitamin D deficiency [E55.9]  Yes      Resolved Hospital Problems    Diagnosis Date Resolved POA    **Dizziness [R42] 2024 Yes          Hospital Course:  Wendi Ravi is a 89 y.o. female with PMHx of HTN, GERD, glaucoma and recent R hip fx in May, presents with acute onset of dizziness with concern for possible stroke.  Stroke team was consulted, MRI performed showed no acute intracranial abnormality, did show a remote left occipital infarct.  Stroke team suspects etiology secondary to small vessel ischemic disease in setting of age, hypertension, hyperlipidemia.  Recommendations are to increase aspirin to 325 daily, continue atorvastatin.  PT/OT has evaluated and recommended home.  She has recently been normotensive to hypotensive here, would recommend holding blood pressure medications until able to follow-up with primary care physician.  Follow-up in stroke clinic      Discharge Follow Up Recommendations for outpatient labs/diagnostics:  PCP, stroke clinic    Day of Discharge     HPI:   She reports her dizziness has resolved and is back to her baseline    Review of Systems  Gen- No fevers, chills  CV- No chest pain, palpitations  Resp- No cough, dyspnea  GI- No N/V/D, abd pain      Vital Signs:   Temp:  [97.4 °F (36.3 °C)-98.8 °F (37.1 °C)] 98.8 °F (37.1  °C)  Heart Rate:  [] 89  Resp:  [16] 16  BP: (105-129)/(62-89) 120/77      Physical Exam:  Constitutional: No acute distress, awake, alert  HENT: NCAT, mucous membranes moist  Respiratory: Clear to auscultation bilaterally, respiratory effort normal   Cardiovascular: RRR, no murmurs, rubs, or gallops  Gastrointestinal: Positive bowel sounds, soft, nontender, nondistended  Musculoskeletal: No bilateral ankle edema  Psychiatric: Appropriate affect, cooperative  Neurologic: Oriented x 3, strength symmetric in all extremities, Cranial Nerves grossly intact to confrontation, speech clear  Skin: No rashes      Pertinent  and/or Most Recent Results     LAB RESULTS:      Lab 07/29/24  0532   WBC 6.35   HEMOGLOBIN 11.7*   HEMATOCRIT 36.2   PLATELETS 182   NEUTROS ABS 3.89   IMMATURE GRANS (ABS) 0.02   LYMPHS ABS 1.61   MONOS ABS 0.62   EOS ABS 0.17   .6*         Lab 07/29/24  0533 07/28/24  1033   SODIUM 140  --    POTASSIUM 4.5  --    CHLORIDE 106  --    CO2 28.0  --    ANION GAP 6.0  --    BUN 26*  --    CREATININE 0.97  --    EGFR 55.3*  --    GLUCOSE 92  --    CALCIUM 8.7  --    HEMOGLOBIN A1C  --  5.50                 Lab 07/28/24  1032   CHOLESTEROL 186   LDL CHOL 113*   HDL CHOL 62*   TRIGLYCERIDES 56         Lab 07/28/24  1032   IRON 100   IRON SATURATION (TSAT) 33   TIBC 299   TRANSFERRIN 201   FOLATE >20.00   VITAMIN B 12 309         Brief Urine Lab Results       None          Microbiology Results (last 10 days)       ** No results found for the last 240 hours. **            MRI Brain Without Contrast    Result Date: 7/28/2024  MRI BRAIN WO CONTRAST Date of Exam: 7/28/2024 1:59 PM EDT Indication: Stroke, follow up stroke.  Comparison: Outside CT head from [facility] dated 7/28/2024 Technique:  Routine multiplanar/multisequence sequence images of the brain were obtained without contrast administration. Findings: There is no diffusion restriction to suggest acute infarct. There is no  evidence of acute or chronic intracranial hemorrhage. No mass effect or midline shift. No abnormal extra-axial collections. The basal ganglia, brainstem and cerebellum appear within normal limits. Midline structures are intact. There is mild age-related atrophy. Mild subcortical and periventricular T2/FLAIR white matter hyperintensities most likely represent the sequela of chronic microvascular ischemic disease. There is encephalomalacia in the left occipital pole likely related to remote infarct with mild susceptibility artifact compatible with hemosiderin deposition. Calvarial and superficial soft tissue signal is within normal limits. Orbits appear unremarkable. There is opacification of the left sphenoid sinus which is T1 hyperintense and T2 hypointense.     Impression: 1. No acute intracranial abnormality. 2. Mild age-related atrophy with presumed sequela of microvascular ischemic changes. Remote left occipital infarct. 3. T1 hyperintense/T2 hypointense opacification of the left sphenoid sinus which may represent inspissated secretions or fungal colonization. Electronically Signed: Inge Gates MD  2024 2:57 PM EDT  Workstation ID: [id number]    Adult Transthoracic Echo Complete W/ Cont if Necessary Per Protocol (With Agitated Saline)    Result Date: 2024    Left ventricular systolic function is normal. Estimated left ventricular EF = 60%   No hemodynamically significant valvular heart disease     CT Outside Films    Result Date: 2024  This procedure was auto-finalized with no dictation required.    CT Head Without Contrast    Result Date: 2024  [id number] [place] [address] Phone: [phone] Fax: [phone] Name: GHISLAINE STEVENS Exam Date: 2024 : 1933 Age 89 years Gender: F Accession: [id number] MRN: [id number] Physician: ALMA PATEL Facility: [facility] Facility HSV: Outpatient Exam: CT BRAIN W/O (STROKE PROTOCOL) FINAL REPORT  TECHNIQUE: null CLINICAL HISTORY: Numbness Left Side COMPARISON: null FINDINGS: CT BRAIN WITHOUT CONTRAST ß¦COMPARISON: None. ß¦FINDINGS: There is mild parenchymal atrophy. There is no evidence of an acute infarct or intraparenchymal hemorrhage. Remote infarction is noted within the left occipital lobe. Patchy areas of low attenuation are noted in the subcortical and periventricular regions of the supratentorial brain which are nonspecific but most likely represent chronic small vessel ischemic disease. There is no mass effect, midline shift, or extra-axial blood. The ventricles are normal in size without evidence of hydrocephalus. The bone windows are unremarkable. There is opacification of the left sphenoid sinus. IMPRESSION: IMPRESSION: 1. No acute disease in the brain. 2. Left sphenoid sinus disease is noted. Authenticated and Electronically Signed by Cy Melton MD on 07/28/2024 04:59:47 AMEASTERN Dictated By:  Cy Melton Transcribed By: Transcribed On: 7/28/2024 4:59 AM Electronically signed by: Cy Melton 7/28/2024 Thank you for referring GHISLAINE STEVENS to [facility]. Legally authenticated by NOA MEJIAS 2024-07-28 04:59:47             Results for orders placed during the hospital encounter of 07/28/24    Adult Transthoracic Echo Complete W/ Cont if Necessary Per Protocol (With Agitated Saline)    Interpretation Summary    Left ventricular systolic function is normal. Estimated left ventricular EF = 60%    No hemodynamically significant valvular heart disease      Plan for Follow-up of Pending Labs/Results: no pending results    Discharge Details        Discharge Medications        New Medications        Instructions Start Date   atorvastatin 80 MG tablet  Commonly known as: LIPITOR   80 mg, Oral, Nightly             Changes to Medications        Instructions Start Date   aspirin 325 MG EC tablet  What changed:   medication strength  how much to take   325 mg, Oral, Daily   Start  Date: July 30, 2024            Continue These Medications        Instructions Start Date   latanoprost 0.005 % ophthalmic solution  Commonly known as: XALATAN   1 drop, Both Eyes, Nightly      timolol 0.5 % ophthalmic solution  Commonly known as: TIMOPTIC   1 drop, Both Eyes, Daily, IN the morning             Stop These Medications      lisinopril 20 MG tablet  Commonly known as: PRINIVIL,ZESTRIL     omeprazole 40 MG capsule  Commonly known as: priLOSEC              No Known Allergies      Discharge Disposition:  Home or Self Carehome    Diet:  Hospital:  Diet Order   Procedures    Diet: Regular/House, Cardiac; Healthy Heart (2-3 Na+); Fluid Consistency: Thin (IDDSI 0)            Activity:  as tolerated    Restrictions or Other Recommendations:  none       CODE STATUS:    Code Status and Medical Interventions: CPR (Attempt to Resuscitate); Full Support   Ordered at: 07/28/24 0925     Level Of Support Discussed With:    Patient     Code Status (Patient has no pulse and is not breathing):    CPR (Attempt to Resuscitate)     Medical Interventions (Patient has pulse or is breathing):    Full Support       No future appointments.              Elaina Smith MD  07/29/24      Time Spent on Discharge:  I spent  45  minutes on this discharge activity which included: face-to-face encounter with the patient, reviewing the data in the system, coordination of the care with the nursing staff as well as consultants, documentation, and entering orders.

## 2024-07-29 NOTE — PLAN OF CARE
Problem: Adult Inpatient Plan of Care  Goal: Plan of Care Review  Outcome: Ongoing, Progressing  Flowsheets (Taken 7/29/2024 0423)  Progress: improving  Plan of Care Reviewed With:   patient   family  Goal: Patient-Specific Goal (Individualized)  Outcome: Ongoing, Progressing  Goal: Absence of Hospital-Acquired Illness or Injury  Outcome: Ongoing, Progressing  Intervention: Identify and Manage Fall Risk  Recent Flowsheet Documentation  Taken 7/29/2024 0412 by Monserrat Brown RN  Safety Promotion/Fall Prevention:   activity supervised   assistive device/personal items within reach   safety round/check completed  Taken 7/29/2024 0212 by Monserrat Brown RN  Safety Promotion/Fall Prevention:   activity supervised   assistive device/personal items within reach   safety round/check completed  Taken 7/29/2024 0008 by Monserrat Brown RN  Safety Promotion/Fall Prevention:   activity supervised   assistive device/personal items within reach   safety round/check completed  Taken 7/28/2024 2206 by Monserrat Brown RN  Safety Promotion/Fall Prevention:   activity supervised   assistive device/personal items within reach   safety round/check completed  Taken 7/28/2024 2058 by Monserrat Brown RN  Safety Promotion/Fall Prevention:   activity supervised   assistive device/personal items within reach   clutter free environment maintained   fall prevention program maintained   lighting adjusted   muscle strengthening facilitated   nonskid shoes/slippers when out of bed   room organization consistent   safety round/check completed  Intervention: Prevent Skin Injury  Recent Flowsheet Documentation  Taken 7/29/2024 0412 by Monserrat Brown RN  Body Position:   supine   weight shifting  Taken 7/29/2024 0212 by Monserrat Brown RN  Body Position: position changed independently  Taken 7/29/2024 0008 by Monserrat Brown RN  Body Position:   supine   position changed independently  Taken 7/28/2024 2206 by Monserrat Brown RN  Body  Position: position changed independently  Taken 7/28/2024 2058 by Monserrat Brown RN  Body Position:   supine   position changed independently  Intervention: Prevent and Manage VTE (Venous Thromboembolism) Risk  Recent Flowsheet Documentation  Taken 7/29/2024 0412 by Monserrat Brown RN  Activity Management: activity encouraged  VTE Prevention/Management:   bilateral   sequential compression devices off  Taken 7/29/2024 0212 by Monserrat Brown RN  Activity Management: activity encouraged  VTE Prevention/Management:   bilateral   sequential compression devices off  Taken 7/29/2024 0008 by Monserrat Brown RN  Activity Management: activity encouraged  VTE Prevention/Management:   bilateral   sequential compression devices off  Taken 7/28/2024 2206 by Monserrat Brown RN  Activity Management: activity encouraged  VTE Prevention/Management:   bilateral   sequential compression devices off  Taken 7/28/2024 2058 by Monserrat Brown RN  Activity Management: activity encouraged  VTE Prevention/Management:   bilateral   sequential compression devices off  Range of Motion: active ROM (range of motion) encouraged  Intervention: Prevent Infection  Recent Flowsheet Documentation  Taken 7/29/2024 0412 by Monserrat Brown RN  Infection Prevention:   hand hygiene promoted   rest/sleep promoted  Taken 7/29/2024 0212 by Monserrat Brown RN  Infection Prevention:   hand hygiene promoted   rest/sleep promoted  Taken 7/29/2024 0008 by Monserrat Brown RN  Infection Prevention:   hand hygiene promoted   rest/sleep promoted  Taken 7/28/2024 2206 by Monserrat Brown RN  Infection Prevention:   hand hygiene promoted   rest/sleep promoted  Taken 7/28/2024 2058 by Monserrat Brown RN  Infection Prevention:   hand hygiene promoted   rest/sleep promoted  Goal: Optimal Comfort and Wellbeing  Outcome: Ongoing, Progressing  Intervention: Provide Person-Centered Care  Recent Flowsheet Documentation  Taken 7/29/2024 0412 by Monserrat Brown  RN  Trust Relationship/Rapport: care explained  Taken 7/29/2024 0212 by Monserrat Brown RN  Trust Relationship/Rapport: care explained  Taken 7/29/2024 0008 by Monserrat Brown RN  Trust Relationship/Rapport: care explained  Taken 7/28/2024 2206 by Monserrat Brown RN  Trust Relationship/Rapport: care explained  Taken 7/28/2024 2058 by Monserrat Brown RN  Trust Relationship/Rapport:   care explained   choices provided   questions answered   reassurance provided   questions encouraged  Goal: Readiness for Transition of Care  Outcome: Ongoing, Progressing     Problem: Hypertension Comorbidity  Goal: Blood Pressure in Desired Range  Outcome: Ongoing, Progressing  Intervention: Maintain Blood Pressure Management  Recent Flowsheet Documentation  Taken 7/28/2024 2058 by Monserrat Brown RN  Medication Review/Management: medications reviewed     Problem: Fall Injury Risk  Goal: Absence of Fall and Fall-Related Injury  Outcome: Ongoing, Progressing  Intervention: Identify and Manage Contributors  Recent Flowsheet Documentation  Taken 7/28/2024 2058 by Monserrat Brown RN  Medication Review/Management: medications reviewed  Intervention: Promote Injury-Free Environment  Recent Flowsheet Documentation  Taken 7/29/2024 0412 by Monserrat Brown RN  Safety Promotion/Fall Prevention:   activity supervised   assistive device/personal items within reach   safety round/check completed  Taken 7/29/2024 0212 by Monserrat Brown RN  Safety Promotion/Fall Prevention:   activity supervised   assistive device/personal items within reach   safety round/check completed  Taken 7/29/2024 0008 by Monserrat Brown RN  Safety Promotion/Fall Prevention:   activity supervised   assistive device/personal items within reach   safety round/check completed  Taken 7/28/2024 2206 by Monserrat Brown RN  Safety Promotion/Fall Prevention:   activity supervised   assistive device/personal items within reach   safety round/check completed  Taken 7/28/2024  2058 by Monserrat Brown, RN  Safety Promotion/Fall Prevention:   activity supervised   assistive device/personal items within reach   clutter free environment maintained   fall prevention program maintained   lighting adjusted   muscle strengthening facilitated   nonskid shoes/slippers when out of bed   room organization consistent   safety round/check completed   Goal Outcome Evaluation:  Plan of Care Reviewed With: patient, family      Pt currently in bed resting quietly. No complaints of pain or discomfort at this time. Pt family at bedside. NIH of 1 r/t visual deficits likely r/t glaucoma. Neuro checks WNL. VSS. Pt A/Ox4. PT/OT to assess. No other observations at this time, call bell in reach.  Progress: improving

## 2024-07-29 NOTE — PROGRESS NOTES
Stroke Neurology Progress Note     Subjective     This patient was seen in follow-up for: transient ischemic attack  Present for the encounter were: self, patient, patient's daughter    Subjective:  My first encounter with the patient.  No acute events overnight. Patient describes a several hour episode of left lower extremity weakness concerning for TIA.  Her symptoms have resolved to baseline.  At home, she was taking aspirin 81 mg daily.  Agrees to increase to aspirin 325 mg daily.  Reports some bruising.  She will call clinic if she would like to reduce back to 81 mg daily due to side effect.    Objective      Temp:  [97.4 °F (36.3 °C)-97.9 °F (36.6 °C)] 97.5 °F (36.4 °C)  Heart Rate:  [64-72] 67  Resp:  [16] 16  BP: (105-143)/(62-89) 119/89            Objective    Physical Exam:  General Appearance: Alert  HEENT: anicteric sclera, no scleral injection  Lungs: respirations appear comfortable, no obvious increased work of breathing  Extremities: No cyanosis or fingernail clubbing   Skin: No rashes in exposed skin areas     Neurological Examination:   Mental status: Alert and oriented. No dysarthria. Able to name and repeat.  Cranial Nerves: Visual fields intact. Extraocular movements intact with no nystagmus. Midline gaze. Face symmetric.    Sensory: Normal sensory exam to light touch.  Motor: Normal tone. Absent pronator drift.  Strength:  LUE: 5/5 biceps, triceps,   LLE: 5/5 hip flexion/extension  RUE: 5/5 biceps, triceps,   RLE:  5/5 hip flexion/extension  Cerebellar: Finger-to-nose intact. Heel-to-shin intact. Rapid alternating movements are intact.   Gait: Not assessed.     Labs:    Lab Results   Component Value Date    HGBA1C 5.50 07/28/2024      Lab Results   Component Value Date    CHOL 186 07/28/2024    CHLPL 158 06/15/2021    TRIG 56 07/28/2024    HDL 62 (H) 07/28/2024     (H) 07/28/2024       Lab Results   Component Value Date    WBC 6.35 07/29/2024    HGB 11.7 (L) 07/29/2024    HCT 36.2  07/29/2024    .6 (H) 07/29/2024     07/29/2024     Lab Results   Component Value Date    GLUCOSE 92 07/29/2024    BUN 26 (H) 07/29/2024    CREATININE 0.97 07/29/2024    EGFRIFNONA 47 (L) 04/05/2022    EGFRIFAFRI 57 (L) 04/05/2022    BCR 26.8 (H) 07/29/2024    CO2 28.0 07/29/2024    CALCIUM 8.7 07/29/2024    ALBUMIN 4.1 04/05/2022    AST 24 04/05/2022    ALT 10 04/05/2022       Results from last 7 days   Lab Units 07/29/24  0533   SODIUM mmol/L 140   POTASSIUM mmol/L 4.5   CHLORIDE mmol/L 106   CO2 mmol/L 28.0   BUN mg/dL 26*   CREATININE mg/dL 0.97   CALCIUM mg/dL 8.7   GLUCOSE mg/dL 92       Results Review:      All brain images and reports were personally reviewed and I agree with the interpretations except as noted below.    MRI Brain Without Contrast 7/28/2024  Impression: 1. No acute intracranial abnormality. 2. Mild age-related atrophy with presumed sequela of microvascular ischemic changes. Remote left occipital infarct. 3. T1 hyperintense/T2 hypointense opacification of the left sphenoid sinus which may represent inspissated secretions or fungal colonization.     Transthoracic cardiogram 7/28/2024  Impression:        Left ventricular systolic function is normal. Estimated left   ventricular EF = 60%    No hemodynamically significant valvular heart disease              Assessment/Plan     Assessment:    # Transient ischemic attack  Suspect etiology secondary to small vessel ischemic disease in setting of age, HTN, HLD.    -Increase aspirin to 325 mg daily  -Continue atorvastatin 80 mg daily ()  -PT OT pending  -Neuro-checks per unit protocol while inpatient  -Blood pressure goal: permissive  -DVT prophylaxis: SCD  -Follow-up in stroke clinic       Patient education: call 911 or present to emergency department with any stroke symptom, including unilateral face, arm, or leg weakness, numbness, or paresthesias, unilateral facial droop, speech deficits, dizziness with nausea, vomiting,  nystagmus, and incoordination, visual deficits, or severe onset headache.    Stroke neurology will follow results of above workup.  Anticipate patient will be cleared for home after PT OT evaluation.  Please call with questions.     Kimi Holguin MD  [facility] [place]  07/29/24  09:02 EDT

## 2024-07-29 NOTE — CONSULTS
Diabetes Education    Patient Name:  Wendi Ravi  YOB: 1933  MRN: [id number]  Admit Date:  7/28/2024    Order criteria not met for diabetes education consult. Current A1c is 5.5, noted during chart review. Pt has no history of DM and no home meds for DM. Thank you.        Electronically signed by:  Candice Owen RN  07/29/24 10:10 EDT

## 2024-07-29 NOTE — CASE MANAGEMENT/SOCIAL WORK
Discharge Planning Assessment  [facility]     Patient Name: Wendi Ravi  MRN: [id number]  Today's Date: 7/29/2024    Admit Date: 7/28/2024    Plan: Home   Discharge Needs Assessment       Row Name 07/29/24 1013       Living Environment    People in Home child(kylah), adult  Simultaneous filing. User may be unaware of other data.    Current Living Arrangements home    Primary Care Provided by self    Provides Primary Care For no one    Family Caregiver if Needed child(kylah), adult    Family Caregiver Names Mandeep Ravi    Quality of Family Relationships supportive    Able to Return to Prior Arrangements yes       Resource/Environmental Concerns    Transportation Concerns none       Transition Planning    Patient/Family Anticipates Transition to home with family    Patient/Family Anticipated Services at Transition none    Transportation Anticipated family or friend will provide       Discharge Needs Assessment    Readmission Within the Last 30 Days no previous admission in last 30 days    Equipment Currently Used at Home walker, rolling                   Discharge Plan       Row Name 07/29/24 1016       Plan    Plan Home    Patient/Family in Agreement with Plan yes    Plan Comments Spoke with Ms. Ravi and family at the bedside. Ms. Ravi lives with her Son in [facility]. She is independent with ADL's. She has a rolling walker. She does not use oxygen or home health. Her POA is her Daughter Noemi and she has a living will. Her PCP is Emily Smith and she has [employer] Medicare insurance. Discharge plan is home. CM will continue to follow for discharge needs.    Final Discharge Disposition Code 01 - home or self-care                  Continued Care and Services - Admitted Since 7/28/2024    No active coordination exists for this encounter.       Expected Discharge Date and Time       Expected Discharge Date Expected Discharge Time    Jul 29, 2024            Demographic Summary       Row Name 07/29/24 1013        General Information    Admission Type observation    Arrived From home    Referral Source admission list    Reason for Consult discharge planning    Preferred Language English       Contact Information    Permission Granted to Share Info With                    Functional Status       Row Name 07/29/24 1013       Functional Status, IADL    Medications independent    Meal Preparation independent    Housekeeping independent    Laundry independent    Shopping independent       Mental Status    General Appearance WDL WDL                   Psychosocial    No documentation.                  Abuse/Neglect    No documentation.                  Legal    No documentation.                  Substance Abuse    No documentation.                  Patient Forms    No documentation.                     Lashonda Arora RN

## 2024-07-29 NOTE — PLAN OF CARE
Goal Outcome Evaluation:  Plan of Care Reviewed With: (P) patient, family           Outcome Evaluation: (P) Pt presents with mobility near baseline, however, pt would continue to benefit from skilled therapy services during admission to prevent functional decline. Pt able to ambulate 100' with FWW and SBA with ability to complete stair training simulated to home environment. PT recommended DC to home with assist.      Anticipated Discharge Disposition (PT): (P) home with assist

## 2024-07-29 NOTE — PLAN OF CARE
Goal Outcome Evaluation:  Plan of Care Reviewed With: patient, family        Progress: no change  Outcome Evaluation: Pt presents to OT evaluation w/ minor deficits in functional activity tolerance and strength. Pt would benefit from IPOT services to address deficitis in order to progress towards PLOF. d/c rec is home w/ assist.      Anticipated Discharge Disposition (OT): inpatient rehabilitation facility

## 2024-07-29 NOTE — THERAPY EVALUATION
Patient Name: Wendi Ravi  : 1933    MRN: [id number]                              Today's Date: 2024       Admit Date: 2024    Visit Dx:     ICD-10-CM ICD-9-CM   1. Transient ischemic attack (TIA)  G45.9 435.9     Patient Active Problem List   Diagnosis    Dizziness    Primary hypertension    GERD without esophagitis    Macrocytic anemia    History of fracture of right hip    Vitamin D deficiency     Past Medical History:   Diagnosis Date    Arthritis     Cancer     GERD (gastroesophageal reflux disease)     Hypertension      Past Surgical History:   Procedure Laterality Date    APPENDECTOMY      COLON SURGERY      COLONOSCOPY      EYE SURGERY      FRACTURE SURGERY      HIP FRACTURE SURGERY Right     HYSTERECTOMY      SKIN BIOPSY        General Information       Row Name 24 1050          OT Time and Intention    Document Type evaluation  -     Mode of Treatment occupational therapy  -       Row Name 24 1050          General Information    Patient Profile Reviewed yes  -KL     Prior Level of Function independent:;all household mobility;community mobility;gait;transfer;w/c or scooter;ADL's  FWW; shower chair; grab bars  -     Existing Precautions/Restrictions fall  -     Barriers to Rehab none identified  -       Row Name 24 1050          Living Environment    People in Home child(kylah), adult  -       Row Name 24 1050          Home Main Entrance    Number of Stairs, Main Entrance one  ramp + 1 step  -       Row Name 24 1050          Stairs Within Home, Primary    Number of Stairs, Within Home, Primary none  -       Row Name 24 1050          Cognition    Orientation Status (Cognition) oriented x 3  -       Row Name 24 1050          Safety Issues, Functional Mobility    Safety Issues Affecting Function (Mobility) awareness of need for assistance;safety precaution awareness  -     Impairments Affecting Function (Mobility)  strength;endurance/activity tolerance  -               User Key  (r) = Recorded By, (t) = Taken By, (c) = Cosigned By      Initials Name Provider Type    Spring Sanchez OT Occupational Therapist                     Mobility/ADL's       Row Name 07/29/24 1052          Transfers    Transfers sit-stand transfer  -KL       Row Name 07/29/24 1052          Sit-Stand Transfer    Sit-Stand [place] (Transfers) verbal cues;contact guard  -     Assistive Device (Sit-Stand Transfers) walker, front-wheeled  -KL       Row Name 07/29/24 1052          Functional Mobility    Functional Mobility- Ind. Level contact guard assist;1 person  -     Functional Mobility- Device walker, front-wheeled  [facility]     Functional Mobility-Distance (Feet) --  > HH distances  -KL       Row Name 07/29/24 1052          Activities of Daily Living    BADL Assessment/Intervention upper body dressing  -KL       Row Name 07/29/24 1052          Upper Body Dressing Assessment/Training    [place] Level (Upper Body Dressing) don;pajama/robe;set up  -     Position (Upper Body Dressing) edge of bed sitting  -               User Key  (r) = Recorded By, (t) = Taken By, (c) = Cosigned By      Initials Name Provider Type    Spring Sanchez OT Occupational Therapist                   Obj/Interventions       Row Name 07/29/24 1053          Sensory Assessment (Somatosensory)    Sensory Assessment (Somatosensory) UE sensation intact  -KL       Row Name 07/29/24 1053          Range of Motion Comprehensive    General Range of Motion bilateral upper extremity ROM WFL  -KL       Row Name 07/29/24 1053          Strength Comprehensive (MMT)    Comment, General Manual Muscle Testing (MMT) Assessment BUE MMT WFL  -KL       Row Name 07/29/24 1053          Balance    Static Sitting Balance standby assist  -     Dynamic Sitting Balance supervision  -     Position, Sitting Balance unsupported;sitting edge of bed  -     Static Standing Balance supervision  -      Dynamic Standing Balance contact guard  -     Position/Device Used, Standing Balance supported  -     Balance Interventions sitting;standing;sit to stand;supported;dynamic;occupation based/functional task  -               User Key  (r) = Recorded By, (t) = Taken By, (c) = Cosigned By      Initials Name Provider Type    Spring Sanchez OT Occupational Therapist                   Goals/Plan       Row Name 07/29/24 1056          Transfer Goal 1 (OT)    Activity/Assistive Device (Transfer Goal 1, OT) sit-to-stand/stand-to-sit;toilet  -KL     [place] Level/Cues Needed (Transfer Goal 1, OT) modified independence  -KL     Time Frame (Transfer Goal 1, OT) short term goal (STG);1 day  -KL     Progress/Outcome (Transfer Goal 1, OT) new [facility]  -       Row Name 07/29/24 1056          Dressing Goal 1 (OT)    Activity/Device (Dressing Goal 1, OT) lower body dressing  -KL     [place]/Cues Needed (Dressing Goal 1, OT) supervision required  -KL     Time Frame (Dressing Goal 1, OT) long term goal (LTG);3 days  -KL     Progress/Outcome (Dressing Goal 1, OT) new goal  -[facility] Name 07/29/24 1056          Toileting Goal 1 (OT)    Activity/Device (Toileting Goal 1, OT) adjust/manage clothing;perform perineal hygiene  -KL     [place] Level/Cues Needed (Toileting Goal 1, OT) supervision required  -KL     Time Frame (Toileting Goal 1, OT) long term goal (LTG);3 days  -KL     Progress/Outcome (Toileting Goal 1, OT) new goal  -       Row Name 07/29/24 1056          Therapy Assessment/Plan (OT)    Planned Therapy Interventions (OT) activity tolerance training;occupation/activity based interventions;strengthening exercise;transfer/mobility retraining;patient/caregiver education/training  -               User Key  (r) = Recorded By, (t) = Taken By, (c) = Cosigned By      Initials Name Provider Type    Spring Sanchez OT Occupational Therapist                   Clinical Impression       Row Name 07/29/24 1050           Pain Assessment    Pretreatment Pain Rating 0/10 - no pain  -     Posttreatment Pain Rating 0/10 - no pain  -       Row Name 07/29/24 1054          Plan of Care Review    Plan of Care Reviewed With patient;family  -     Progress no change  -     Outcome Evaluation Pt presents to OT evaluation w/ minor deficits in functional activity tolerance and strength. Pt would benefit from IPOT services to address deficitis in order to progress towards OF. d/c rec is home w/ assist.  -       Row Name 07/29/24 1054          Therapy Assessment/Plan (OT)    Patient/Family Therapy Goal Statement (OT) Return to PLOF  -     Rehab Potential (OT) good, to achieve stated therapy goals  -     Criteria for Skilled Therapeutic Interventions Met (OT) yes  -     Therapy Frequency (OT) daily  -     Predicted Duration of Therapy Intervention (OT) 2 days  -       Row Name 07/29/24 1054          Therapy Plan Review/Discharge Plan (OT)    Anticipated Discharge Disposition (OT) inpatient rehabilitation facility  -[facility] Name 07/29/24 1054          Vital Signs    Pre Systolic BP Rehab 114  -KL     Pre Treatment Diastolic BP 79  -KL     Post Systolic BP Rehab 142  -KL     Post Treatment Diastolic BP 77  -KL     Pretreatment Heart Rate (beats/min) 94  -KL     Intratreatment Heart Rate (beats/min) 144  -KL     Posttreatment Heart Rate (beats/min) 104  -KL     Pre SpO2 (%) 94  -KL     O2 Delivery Pre Treatment room air  -     Post SpO2 (%) 94  -KL     O2 Delivery Post Treatment room air  -     Pre Patient Position Sitting  -     Intra Patient Position Standing  -     Post Patient Position Sitting  -[facility] Name 07/29/24 1054          Positioning and Restraints    Pre-Treatment Position in bed  -     Post Treatment Position chair  -     In Chair notified nsg;reclined;sitting;call light within reach;encouraged to call for assist;exit alarm on;with family/caregiver;waffle cushion;legs elevated  -                User Key  (r) = Recorded By, (t) = Taken By, (c) = Cosigned By      Initials Name Provider Type    Spring Sanchez, MAILE Occupational Therapist                   Outcome Measures       Row Name 07/29/24 1057          How much help from another is currently needed...    Putting on and taking off regular lower body clothing? 3  -KL     Bathing (including washing, rinsing, and drying) 3  -KL     Toileting (which includes using toilet bed pan or urinal) 3  -KL     Putting on and taking off regular upper body clothing 4  -KL     Taking care of personal grooming (such as brushing teeth) 4  -KL     Eating meals 4  -KL     AM-PAC 6 Clicks Score (OT) 21  -KL       Row Name 07/29/24 1028          How much help from another person do you currently need...    Turning from your back to your side while in flat bed without using bedrails? 4 (P)   -HM     Moving from lying on back to sitting on the side of a flat bed without bedrails? 4 (P)   -HM     Moving to and from a bed to a chair (including a wheelchair)? 4 (P)   -HM     Standing up from a chair using your arms (e.g., wheelchair, bedside chair)? 4 (P)   -HM     Climbing 3-5 steps with a railing? 3 (P)   -HM     To walk in hospital room? 4 (P)   -HM     AM-PAC 6 Clicks Score (PT) 23 (P)   -HM     Highest Level of Mobility Goal 7 --> Walk 25 feet or more (P)   -       Row Name 07/29/24 1057 07/29/24 1028       Modified [place] Scale    Pre-Stroke Modified [place] Scale 6 - Unable to determine (UTD) from the medical record documentation  -KL 6 - Unable to determine (UTD) from the medical record documentation (P)   -HM    Modified [place] Scale 2 - Slight disability.  Unable to carry out all previous activities but able to look after own affairs without assistance.  -KL 0 - No Symptoms at all. (P)   -      Row Name 07/29/24 1057 07/29/24 1028       Functional Assessment    Outcome Measure Options AM-PAC 6 Clicks Daily Activity (OT);Modified [place]  -KL AM-PAC 6 Clicks Basic  Mobility (PT);Modified [place] (P)   -              User Key  (r) = Recorded By, (t) = Taken By, (c) = Cosigned By      Initials Name Provider Type    Spring Sanchez OT Occupational Therapist     William Welch, PT Student PT Student                    Occupational Therapy Education       Title: PT OT SLP Therapies (In Progress)       Topic: Occupational Therapy (In Progress)       Point: ADL training (In Progress)       Description:   Instruct learner(s) on proper safety adaptation and remediation techniques during self care or transfers.   Instruct in proper use of assistive devices.                  Learning Progress Summary             Patient Acceptance, E, NR by  at 7/29/2024 1057   Family Acceptance, E, NR by  at 7/29/2024 1057                         Point: Home exercise program (Not Started)       Description:   Instruct learner(s) on appropriate technique for monitoring, assisting and/or progressing therapeutic exercises/activities.                  Learner Progress:  Not documented in this visit.              Point: Precautions (In Progress)       Description:   Instruct learner(s) on prescribed precautions during self-care and functional transfers.                  Learning Progress Summary             Patient Acceptance, E, NR by  at 7/29/2024 1057   Family Acceptance, E, NR by  at 7/29/2024 1057                         Point: Body mechanics (In Progress)       Description:   Instruct learner(s) on proper positioning and spine alignment during self-care, functional mobility activities and/or exercises.                  Learning Progress Summary             Patient Acceptance, E, NR by  at 7/29/2024 1057   Family Acceptance, E, NR by  at 7/29/2024 1057                                         User Key       Initials Effective Dates Name Provider Type [facility]    TWAN 02/05/24 -  Spring Malik OT Occupational Therapist OT                  OT Recommendation and Plan  Planned Therapy  Interventions (OT): activity tolerance training, occupation/activity based interventions, strengthening exercise, transfer/mobility retraining, patient/caregiver education/training  Therapy Frequency (OT): daily  Plan of Care Review  Plan of Care Reviewed With: patient, family  Progress: no change  Outcome Evaluation: Pt presents to OT evaluation w/ minor deficits in functional activity tolerance and strength. Pt would benefit from IPOT services to address deficitis in order to progress towards PLOF. d/c rec is home w/ assist.     Time Calculation:   Evaluation Complexity (OT)  Review Occupational Profile/Medical/Therapy History Complexity: brief/low complexity  Assessment, Occupational Performance/Identification of Deficit Complexity: 1-3 performance deficits  Clinical Decision Making Complexity (OT): problem focused assessment/low complexity  Overall Complexity of Evaluation (OT): low complexity     Time Calculation- OT       Row Name 07/29/24 1058             Time Calculation- OT    OT Start Time 0950  -KL      OT Received On 07/29/24  -      OT Goal Re-Cert Due Date 08/08/24  -KL         Untimed Charges    OT Eval/Re-eval Minutes 38  -KL         Total Minutes    Untimed Charges Total Minutes 38  -KL       Total Minutes 38  -KL                User Key  (r) = Recorded By, (t) = Taken By, (c) = Cosigned By      Initials Name Provider Type    Spring Sanchez OT Occupational Therapist                  Therapy Charges for Today       Code Description Service Date Service Provider Modifiers Qty    [id number]  OT EVAL LOW COMPLEXITY 3 7/29/2024 Spring Malik OT GO 1                 Spring Malik OT  7/29/2024

## 2024-08-30 ENCOUNTER — OFFICE VISIT (OUTPATIENT)
Dept: NEUROLOGY | Facility: CLINIC | Age: 89
End: 2024-08-30
Payer: MEDICARE

## 2024-08-30 VITALS
BODY MASS INDEX: 19.59 KG/M2 | WEIGHT: 99.8 LBS | HEIGHT: 60 IN | HEART RATE: 72 BPM | OXYGEN SATURATION: 94 % | SYSTOLIC BLOOD PRESSURE: 116 MMHG | DIASTOLIC BLOOD PRESSURE: 78 MMHG | TEMPERATURE: 98.8 F

## 2024-08-30 DIAGNOSIS — E78.49 OTHER HYPERLIPIDEMIA: ICD-10-CM

## 2024-08-30 DIAGNOSIS — Z86.73 HISTORY OF STROKE: Primary | ICD-10-CM

## 2024-08-30 DIAGNOSIS — Z86.73 HISTORY OF TIA (TRANSIENT ISCHEMIC ATTACK): ICD-10-CM

## 2024-08-30 DIAGNOSIS — I10 PRIMARY HYPERTENSION: ICD-10-CM

## 2024-08-30 RX ORDER — OMEPRAZOLE 40 MG/1
40 CAPSULE, DELAYED RELEASE ORAL
COMMUNITY
Start: 2024-08-13

## 2024-08-30 NOTE — PATIENT INSTRUCTIONS
"Please continue to take your aspirin 325 mg every day for 2 more months to prevent future strokes.  Then changed to aspirin 81 mg daily    Please continue to take atorvastatin 80 mg daily to help lower your cholesterol (your LDL or \"bad cholesterol,\" was 113, the goal is <70    If you develop lightheadedness/dizziness, total body weakness please check your blood pressure, drink a full glass of water and lay down if not improved after a few minutes please call your provider.    If You develop any weakness/numbness/tingling on one side of your body, problems speaking, worsening vision, severe dizziness, or severe headache please call 911  "

## 2024-08-30 NOTE — PROGRESS NOTES
"  New Patient Office Visit      Encounter Date: 2024   Patient Name: Wendi Ravi  : 1933   MRN: [id number]   PCP: Emily Smith MD    Chief Complaint:    Chief Complaint   Patient presents with    Follow-up    Transient Ischemic Attack       History of Present Illness: Wendi Ravi is a 89 y.o. female who is here today to establish care.  Pertinent medical history includes HTN, HLD, anemia, vitamin D deficiency, recent right hip fracture (May, 2024) who was admitted to [facility] 2024 from an outside hospital following an episode of dizziness, blurred vision and left leg weakness.  Initial NIHSS reported as a 3, all symptoms resolved shortly thereafter though it was noted that she had continued right superior quadrantanopsia on exam.  CT head was negative for acute abnormality.  CTA head and neck were negative for significant stenosis, no LVO or aneurysm.  MRI was negative for acute infarct.  There was evidence of a chronic left occipital stroke, no evidence of hemorrhage.  Patient was not aware of any prior stroke history.  TTE showed EF of 60%, normal left atrium.  Symptoms were attributed to a TIA secondary to small vessel disease.  Patient was on aspirin 81 mg prior to this event she was discharged home on aspirin 325 mg as well as atorvastatin    Clinic visit 2024: Patient presents today accompanied by a family member.  She tells me she has been doing well; denies any unilateral weakness, no numbness and tingling, no speech disturbance.  She notes that since she was in the hospital she has had some difficulty with her right upper visual field, no new visual changes.  She also notes that she has been feeling, \"foggy headed,\" since her hospitalization she specifies that it is not a headache, dizziness, or difficulty concentrating.  She does describe having a episode a few days ago of dizziness, lightheadedness, and bilateral lower extremity weakness and generalized weakness.  This " persisted for a short time before completely resolving at which point she noted that the fogginess seemed to have lifted.  She checks her blood pressure daily, reports systolics 120-150.  She has completed PT/OT.  She has remained compliant with aspirin 325 mg and asks if she could exchange this for Tylenol; we reviewed the antiplatelet effects of aspirin and that these are not interchangeable medications.  She has been compliant with atorvastatin.    Stroke Risk Factors: hyperlipidemia and hypertension      Subjective      Review of Systems:   Review of Systems   Constitutional:  Negative for fever.   Eyes:  Positive for visual disturbance.   Respiratory:  Negative for cough and shortness of breath.    Cardiovascular:  Negative for chest pain.   Musculoskeletal:  Positive for gait problem.   Neurological:  Positive for dizziness and weakness. Negative for facial asymmetry, speech difficulty, numbness and headache.       Past Medical History:   Past Medical History:   Diagnosis Date    Arthritis     Cancer     GERD (gastroesophageal reflux disease)     Hypertension        Past Surgical History:   Past Surgical History:   Procedure Laterality Date    APPENDECTOMY      COLON SURGERY      COLONOSCOPY      EYE SURGERY      FRACTURE SURGERY      HIP FRACTURE SURGERY Right     HYSTERECTOMY      SKIN BIOPSY         Family History:   Family History   Problem Relation Age of Onset    Cancer Mother     Diabetes Father     Heart disease Father     Cancer Sister     Diabetes Sister     Cancer Brother     Cancer Daughter     Cancer Son     No Known Problems Maternal Aunt     No Known Problems Maternal Uncle     No Known Problems Paternal Aunt     No Known Problems Paternal Uncle     No Known Problems Maternal Grandmother     No Known Problems Maternal Grandfather     No Known Problems Paternal Grandmother     No Known Problems Paternal Grandfather     No Known Problems Other        Social History:   Social History  "    Socioeconomic History    Marital status:    Tobacco Use    Smoking status: Never     Passive exposure: Never    Smokeless tobacco: Never   Vaping Use    Vaping status: Never Used   Substance and Sexual Activity    Alcohol use: Never    Drug use: Never    Sexual activity: Defer       Medications:     Current Outpatient Medications:     aspirin 325 MG EC tablet, Take 1 tablet by mouth Daily., Disp: 90 tablet, Rfl: 0    atorvastatin (LIPITOR) 80 MG tablet, Take 1 tablet by mouth Every Night., Disp: 90 tablet, Rfl: 0    latanoprost (XALATAN) 0.005 % ophthalmic solution, Administer 1 drop to both eyes Every Night., Disp: , Rfl:     omeprazole (priLOSEC) 40 MG capsule, Take 1 capsule by mouth., Disp: , Rfl:     timolol (TIMOPTIC) 0.5 % ophthalmic solution, Administer 1 drop to both eyes Daily. IN the morning, Disp: , Rfl:     Allergies:   Allergies   Allergen Reactions    Brimonidine Other (See Comments)     Ophthalmic solution \" caused olfactory sensitivity\"    Metoprolol Other (See Comments)     Kidney Damage       Objective     Physical Exam:  Vital Signs:   Vitals:    08/30/24 1250   BP: 116/78   Pulse: 72   Temp: 98.8 °F (37.1 °C)   SpO2: 94%   Weight: 45.3 kg (99 lb 12.8 oz)   Height: 152.4 cm (60\")     Body mass index is 19.49 kg/m².     Physical Exam  Vitals reviewed.   Constitutional:       Appearance: Normal appearance.      Comments: frail   HENT:      Head: Normocephalic and atraumatic.   Eyes:      General: Lids are normal. Visual field deficit present.      Extraocular Movements: Extraocular movements intact.      Pupils: Pupils are equal, round, and reactive to light.   Cardiovascular:      Rate and Rhythm: Normal rate.   Pulmonary:      Effort: Pulmonary effort is normal. No respiratory distress.   Musculoskeletal:         General: No swelling. Normal range of motion.      Cervical back: Normal range of motion.   Skin:     General: Skin is warm and dry.   Neurological:      Mental Status: She is " alert and oriented to person, place, and time.      Cranial Nerves: Cranial nerve deficit present.      Sensory: No sensory deficit.      Motor: Motor strength is normal.No weakness.   Psychiatric:         Mood and Affect: Mood normal.         Speech: Speech normal.         Behavior: Behavior normal.       Neurological Exam  Mental Status  Alert. Oriented to person, place, and time. Speech is normal. Language is fluent with no aphasia. Attention and concentration are normal.    Cranial Nerves  CN II: Right superior quadrantanopsia.  CN III, IV, VI: Extraocular movements intact bilaterally. Normal lids and orbits bilaterally. Pupils equal round and reactive to light bilaterally.  CN V: Facial sensation is normal.  CN VII: Full and symmetric facial movement.  CN XI: Shoulder shrug strength is normal.  CN XII: Tongue midline without atrophy or fasciculations.    Motor  Normal muscle bulk throughout. No fasciculations present. Normal muscle tone. No abnormal involuntary movements. Strength is 5/5 throughout all four extremities.    Sensory  Light touch is normal in upper and lower extremities.     Coordination  Right: Finger-to-nose normal. Rapid alternating movement normal.Left: Finger-to-nose normal. Rapid alternating movement normal.    Gait    Walks with walker.       NIH Stroke Scale  Time: 15:41 EDT  Person Administering Scale: DAVID Villegas    1a  Level of consciousness: 0=alert; keenly responsive   1b. LOC questions:  0=Answers both questions correctly   1c. LOC commands: 0=Performs both tasks correctly   2.  Best Gaze: 0=normal   3.  Visual: 1=Partial hemianopia   4. Facial Palsy: 0=Normal symmetric movement   5a.  Motor left arm: 0=No drift, limb holds 90 (or 45) degrees for full 10 seconds   5b.  Motor right arm: 0=No drift, limb holds 90 (or 45) degrees for full 10 seconds   6a. motor left le=No drift, limb holds 90 (or 45) degrees for full 10 seconds   6b  Motor right le=No drift, limb  "holds 90 (or 45) degrees for full 10 seconds   7. Limb Ataxia: 0=Absent   8.  Sensory: 0=Normal; no sensory loss   9. Best Language:  0=No aphasia, normal   10. Dysarthria: 0=Normal   11. Extinction and Inattention: 0=No abnormality    Total:   1       Modified [place] Score:     MODIFIED TIFFANY SCALE (to be assessed for each patient having history of stroke) []Stroke history but not assessed  []0: No symptoms at all  []1: No significant disability despite symptoms  []2: Slight disability  [x]3: Moderate disability  []4: Moderately severe disability  []5: Severe disability  []6: Death   Ambulates with a walker    PHQ-2 Depression Screening  Little interest or pleasure in doing things? 0-->not at all   Feeling down, depressed, or hopeless? 0-->not at all   PHQ-2 Total Score 0     STOP-Bang Questionnaire :     STOP-Bang Score  Have you been diagnosed with Sleep Apnea?: no  Snoring?: no  Tired?: no  Observed?: no  Pressure?: no  Stop Score: 0  Body Mass Index more than 35 kg/m2?: no  Age older than 50 year old?: yes  Neck large? \">17\"/43cm-M, >16\"/41cm-F: no  Gender=Male?: no  Total Stop-Bang Score: 1       ITA Fall Risk Clinician Key Questions   Have you fallen in the past year?: Yes (pt broke her hip)       Results Reviewed:     Labs  H&H 11.7/36.2  Platelets 182  Vitamin D34.8    Vitamin B12 309,   folate> 20  A1c 5.5      Imaging    CT head (7/28/2024) remote left occipital infarct, no evidence of acute infarct or hemorrhage    CTA head and neck (radiology report only, imaging is not available from OSH) negative for significant flow-limiting stenosis, no LVO    MRI brain without negative for acute infarct, again seen is the chronic left occipital infarct.  No evidence of hemorrhage    TTE shows EF 60%, normal left atrium, no bubble study performed due to age  Assessment / Plan      Assessment/Plan:   Diagnoses and all orders for this visit:    1. History of stroke (left occipital, chronic appearing on MRI " not previously identified) patient has ongoing right superior quadrantanopsia      History of TIA; 7/28/2024 (transient ischemic attack)  -Etiology likely small vessel disease  -Recommend continuing aspirin 325 mg for 2 more months then can decrease to 81 mg  -Patient does not drive  -Continue statin  -Falls precautions  -Reviewed reduction strategies of modifiable stroke risk factors    2. Primary hypertension  -Normal blood pressure parameters, <140/90  -Avoid hypotension  -Ensure adequate hydration    3. Other hyperlipidemia  -, goal <70; continue statin     Discussed the importance of medication compliance Aspirin 325mg daily and Atorvastatin 80mg nightly and lifestyle modifications adequate control of blood pressure, adequate control of cholesterol (goal LDL <70), increased physical activity, and implementation of healthy diet to help reduce the risk of future cerebrovascular events.  Also discussed the signs symptoms that would warrant the patient return back to the emergency department including unilateral weakness, unilateral numbness, visual disturbances, loss of balance, speech difficulties, and/or a sudden severe headache.  Patient verbalized understanding.      Follow Up:   Return in about 3 months (around 11/30/2024).    DAVID Villegas  [facility]

## 2024-11-19 NOTE — PROGRESS NOTES
2/16/2024         RE: Michael Robertson  [address]  [place] [address]        Dear Colleague,    Thank you for referring your patient, Michael Robertson, to the [facility]. Please see a copy of my visit note below.    [facility] Dermatology Note  Encounter Date: Feb 16, 2024  Office Visit     Dermatology Problem List:  1. Dyshidrotic eczema 2021. Resolved.  - Prior Tx: augmented betamethasone, cetirizine (no improvement), OTC hydrocortisone (no improvement)  2. Symptomatic nevus - chin  - Referred to derm surgery 2/16/24    FHx: Negative for skin cancer.  ____________________________________________    Assessment & Plan:    # Lesion on chin consistent. Nevus, symptomatic - chin. Discussed risks and benefits of treatment, including excision. Patient is concerned with scarring. Has had lesion for years but symptomatic  - Photodocumentation taken today.  - Referral to derm surgery for excision evaluation.   - Recommend patient send updated photo of the nevus 1-2 days prior to her virtual consult with derm surgery.    Procedures Performed:   N/A    Follow-up: with derm surgery second opinion    Staff and Scribe:     Scribe Disclosure:   IJoseline, am serving as a scribe to document services personally performed by Carlotta Dixon MD based on data collection and the provider's statements to me.     Scribe Disclosure:   I, Ila Vasquez, am serving as a scribe to document services personally performed by Carlotta Dixon MD based on data collection and the provider's statements to me.     Provider Disclosure:   The documentation recorded by the scribe accurately reflects the services I personally performed and the decisions made by me.    Carlotta Dixon MD    Department of Dermatology  [facility]: Phone: [phone], Fax:[phone]  [facility]   [facility]  [address], [address]  [place], [place], [address] [phone]  [email or web address]           OUTPATIENT CLINIC CONSULTATION NOTE    Patient care team:  Patient Care Team:  Emily Smith MD as PCP - General (Family Medicine)    Requesting Provider and Reason for consultation: The patient is being seen today at the request of Emily Smith MD for hypertension, TIA.     Subjective:   Chief complaint:   Chief Complaint   Patient presents with    Hypertension     New Patient         Wendi Ravi is a 89 y.o. female.  Cardiac focused problem list:  PACs  Hypertension   On two drug therapy till 2024; permissive hypertension after TIA  TIA   MRI 7/08/2024:  No acute findings.  Incidentally noted remote left occipital infarct  Echocardiogram 7/28/2024:  LVEF 60%. No hemodynamically significant valvular heart disease.   Stroke neurology at  suspected small vessel ischemic disease in the setting of age and comorbidities  Hyperlipidemia   GERD  Macrocytic anemia   History of right hip fracture  Glaucoma     HPI:    Patient presents today in consultation for hypertension and TIA.     Denies exertional chest pain, unusual dyspnea, palpitations.  Functional limitations due to musculoskeletal issues.  Non-smoker.  No known prior stress testing      Review of Systems:  As noted above in the HPI    PFSH:  Patient Active Problem List   Diagnosis    Primary hypertension    GERD without esophagitis    Macrocytic anemia    History of fracture of right hip    Vitamin D deficiency    History of stroke    History of TIA (transient ischemic attack)    Other hyperlipidemia    Premature atrial contractions         Current Outpatient Medications:     aspirin 81 MG chewable tablet, Chew 1 tablet Daily., Disp: , Rfl:     atorvastatin (LIPITOR) 80 MG tablet, Take 1 tablet by mouth Every Night., Disp: 90 tablet, Rfl: 0    calcium carbonate EX (Tums Smoothies) 750 MG chewable  "tablet, Chew 1 tablet Daily., Disp: , Rfl:     ergocalciferol (ERGOCALCIFEROL) 1.25 MG (86079 UT) capsule, Take 1 capsule by mouth. On fridays, Disp: , Rfl:     latanoprost (XALATAN) 0.005 % ophthalmic solution, Administer 1 drop to both eyes Every Night., Disp: , Rfl:     omeprazole (priLOSEC) 40 MG capsule, Take 1 capsule by mouth Daily., Disp: , Rfl:     timolol (TIMOPTIC) 0.5 % ophthalmic solution, Administer 1 drop to both eyes Daily. IN the morning, Disp: , Rfl:     Allergies   Allergen Reactions    Brimonidine Other (See Comments)     Ophthalmic solution \" caused olfactory sensitivity\"    Metoprolol Other (See Comments)     Kidney Damage       Social History     Socioeconomic History    Marital status:    Tobacco Use    Smoking status: Never     Passive exposure: Never    Smokeless tobacco: Never   Vaping Use    Vaping status: Never Used   Substance and Sexual Activity    Alcohol use: Never    Drug use: Never    Sexual activity: Not Currently     Partners: Male     Family History   Problem Relation Age of Onset    Cancer Mother     Diabetes Father     Heart disease Father     Cancer Sister     Diabetes Sister     Cancer Brother     Cancer Daughter     Cancer Son     No Known Problems Maternal Aunt     No Known Problems Maternal Uncle     No Known Problems Paternal Aunt     No Known Problems Paternal Uncle     No Known Problems Maternal Grandmother     No Known Problems Maternal Grandfather     No Known Problems Paternal Grandmother     No Known Problems Paternal Grandfather     No Known Problems Other          Objective:   Physical Exam:  /88 (BP Location: Left arm, Patient Position: Sitting, Cuff Size: Adult)   Pulse 73   Ht 152.4 cm (60\")   Wt 45.4 kg (100 lb)   SpO2 98%   BMI 19.53 kg/m²   CONSTITUTIONAL: No acute distress  CARDIOVASCULAR: Regular rate and rhythm with normal S1 and S2. Without murmur.  PERIPHERAL VASCULAR: No carotid bruit bilaterally.  Normal radial pulse. " Surgery Center: Phone: [phone], Fax: [phone]   ____________________________________________    CC: Skin Check (Area of concern: chin, mole that is changing and pus discharge coming out of it.)    HPI:  Ms. Michael Robertson is a(n) 32 year old female who presents today as a new patient for changing pigmented skin lesion.. Referred by Dr Terrance Kasper    Patient was evaluated by Dr. Kasper for annual physical on 2/9/24. Upon exam, a pigmented lesion on her chin was appreciated. Patient reported that it had been increasing in size, itching, and draining.     Today, she reports the lesion has been present since childhood and that the aforementioned symptoms have persisted. Denies prior dermatology evaluation and personal history of skin cancer.    Patient is otherwise feeling well, without additional skin concerns.    Labs Reviewed:  N/A    Physical Exam:  Vitals: LMP 02/02/2024 (Exact Date)   SKIN: Focused examination of chin was performed.  - pigmented papule on the chin dome shaped. refular  - No other lesions of concern on areas examined.     Medications:  Current Outpatient Medications   Medication     levothyroxine (SYNTHROID/LEVOTHROID) 75 MCG tablet     No current facility-administered medications for this visit.      Past Medical History:   Patient Active Problem List   Diagnosis     Seasonal allergic rhinitis due to other allergic trigger     Episodic tension-type headache, not intractable     Neck muscle spasm     Family history of thyroid disease     Thyroid function test abnormal     Subserous leiomyoma of uterus     Acquired hypothyroidism     Changing pigmented skin lesion-chin     Past Medical History:   Diagnosis Date     Frequent headaches      Thyroid disease 2020        CC No referring provider defined for this encounter. on close of this encounter.        Again, thank you for allowing me to participate in the care of your patient.        Sincerely,        Carlotta Dixon MD   "    Labs:  Labs reviewed by myself    Lab Results   Component Value Date    CHOL 186 07/28/2024     Lab Results   Component Value Date    TRIG 56 07/28/2024     Lab Results   Component Value Date    HDL 62 (H) 07/28/2024     Lab Results   Component Value Date     (H) 07/28/2024     No components found for: \"LDLDIRECTC\"    Diagnostic Data:      ECG 12 Lead    Date/Time: 11/20/2024 2:22 PM  Performed by: Bradford Lynn MD    Authorized by: Bradford Lynn MD  Previous ECG: no previous ECG available  Rhythm: sinus rhythm  Ectopy: atrial premature contractions  QRS axis: right          Results for orders placed during the hospital encounter of 07/28/24    Adult Transthoracic Echo Complete W/ Cont if Necessary Per Protocol (With Agitated Saline)    Interpretation Summary    Left ventricular systolic function is normal. Estimated left ventricular EF = 60%    No hemodynamically significant valvular heart disease      Assessment and Plan:     Premature atrial contractions  -Asymptomatic  -Continue monitoring    Essential hypertension  History of TIA  Mixed hyperlipidemia   -Agree with liberal blood pressure goals.  Neuro suggest maintaining systolic above 140 mmHg, which seems reasonable  -Had a reasonable increase in blood pressure with discontinuation of lisinopril in July  -Blood pressure in the 140s currently.  At times in the 130s.  -Continue current plan.  Continue aspirin, statin    - Return in about 1 year (around 11/20/2025) for Follow up; either ANIKET Jara or ANIKET Diaz, APRN.    "

## 2024-11-20 ENCOUNTER — OFFICE VISIT (OUTPATIENT)
Dept: CARDIOLOGY | Facility: CLINIC | Age: 89
End: 2024-11-20
Payer: MEDICARE

## 2024-11-20 VITALS
OXYGEN SATURATION: 98 % | BODY MASS INDEX: 19.63 KG/M2 | HEART RATE: 73 BPM | WEIGHT: 100 LBS | HEIGHT: 60 IN | DIASTOLIC BLOOD PRESSURE: 88 MMHG | SYSTOLIC BLOOD PRESSURE: 146 MMHG

## 2024-11-20 DIAGNOSIS — I49.1 PREMATURE ATRIAL CONTRACTIONS: Primary | ICD-10-CM

## 2024-11-20 DIAGNOSIS — E78.2 MIXED HYPERLIPIDEMIA: ICD-10-CM

## 2024-11-20 DIAGNOSIS — I10 ESSENTIAL HYPERTENSION: ICD-10-CM

## 2024-11-20 RX ORDER — ERGOCALCIFEROL 1.25 MG/1
50000 CAPSULE ORAL
COMMUNITY
Start: 2024-10-01 | End: 2024-12-24

## 2024-11-20 RX ORDER — ASPIRIN 81 MG/1
81 TABLET, CHEWABLE ORAL DAILY
COMMUNITY

## 2024-11-20 RX ORDER — CALCIUM CARBONATE 750 MG/1
750 TABLET, CHEWABLE ORAL DAILY
COMMUNITY
Start: 2024-10-01

## 2024-12-03 ENCOUNTER — OFFICE VISIT (OUTPATIENT)
Dept: NEUROLOGY | Facility: CLINIC | Age: 89
End: 2024-12-03
Payer: MEDICARE

## 2024-12-03 VITALS
SYSTOLIC BLOOD PRESSURE: 124 MMHG | TEMPERATURE: 97.9 F | HEART RATE: 68 BPM | HEIGHT: 60 IN | OXYGEN SATURATION: 100 % | BODY MASS INDEX: 19.61 KG/M2 | WEIGHT: 99.9 LBS | DIASTOLIC BLOOD PRESSURE: 68 MMHG

## 2024-12-03 DIAGNOSIS — I10 PRIMARY HYPERTENSION: Primary | ICD-10-CM

## 2024-12-03 PROCEDURE — 99214 OFFICE O/P EST MOD 30 MIN: CPT | Performed by: NURSE PRACTITIONER

## 2024-12-03 PROCEDURE — 1159F MED LIST DOCD IN RCRD: CPT | Performed by: NURSE PRACTITIONER

## 2024-12-03 PROCEDURE — 1160F RVW MEDS BY RX/DR IN RCRD: CPT | Performed by: NURSE PRACTITIONER

## 2024-12-03 NOTE — PATIENT INSTRUCTIONS
-Continue 81 mg of aspirin   -Continue 80 mg of Atorvastatin, once daily.    -Do not take aspirin as an analgesic/pain relief while taking a daily low-dose aspirin for primary stroke prevention.    -Consider taking Tylenol, if appropriate.    -Consider contacting your primary care physician regarding pain.  -Chair Yoga for balance exercise 30 minutes 3 - 4 times a day  -Heart and Brain Healthy diet, Consider a Mediterranean Diet.   -Journal BP at home and call PCP with persistent BP >140/90  -Follow up in 6 months  -Goal to decrease LDL to less than 70.

## 2024-12-03 NOTE — PROGRESS NOTES
Stroke Clinic Office Visit     Encounter Date: 2024   Patient Name: Wendi Ravi  : 1933  MRN: [id number]   PCP: Emily Smith MD  Referring Provider: No ref. provider found     Chief Complaint Follow-up and Stroke    History of Present Illness  Wendi Ravi is a 89 y.o. right handed female here for follow up.    The patient has a past medical history of acute ischemic stroke (2024), HLD, HTN, GERD, macrocytic anemia, vitamin D deficiency and more recently a right hip fracture.  The patient was seen at [facility]  2024 for acute onset of left lower extremity weakness, right upper quadrant hemianopia, dizziness and bilateral blurred vision.  Patient underwent a stroke workup, IV TNK was not administered 2/2 LKW greater than 4 hours.    The patient was started on 81 mg of aspirin and 80 mg atorvastatin for secondary stroke prevention without difficulty.      The patient arrives today with her son.  She is walking today with a walker 2/2 recent right hip replacement.  The patient reports, she is doing very well.  The patient is independent with all ADLs.  She no longer drives.  The patient stopped driving many years ago due to increasing anxiety while driving.  The patient is taking aspirin and atorvastatin for secondary stroke prevention.  She denies any episodes of unusual/prolonged bleeding or muscle pain or fatigue with taking a high-dose statin.  The patient continues to monitor stroke risk factors with the primary care physician.  She is exercising, especially after her recent hip replacement.  The patient denies any other neurological symptoms, including: headache, vision changes, dysesthesias, loss of consciousness, seizure or new areas of motor weakness.           Subjective     Past Medical History:   Diagnosis Date    Arthritis     Cancer     GERD (gastroesophageal reflux disease)     Hypertension       Past Surgical History:   Procedure Laterality Date    APPENDECTOMY       "COLON SURGERY      COLONOSCOPY      EYE SURGERY      FRACTURE SURGERY      HIP FRACTURE SURGERY Right     HYSTERECTOMY      SKIN BIOPSY       Family History   Problem Relation Age of Onset    Cancer Mother     Diabetes Father     Heart disease Father     Cancer Sister     Diabetes Sister     Cancer Brother     Cancer Daughter     Cancer Son     No Known Problems Maternal Aunt     No Known Problems Maternal Uncle     No Known Problems Paternal Aunt     No Known Problems Paternal Uncle     No Known Problems Maternal Grandmother     No Known Problems Maternal Grandfather     No Known Problems Paternal Grandmother     No Known Problems Paternal Grandfather     No Known Problems Other       Social History     Socioeconomic History    Marital status:    Tobacco Use    Smoking status: Never     Passive exposure: Never    Smokeless tobacco: Never   Vaping Use    Vaping status: Never Used   Substance and Sexual Activity    Alcohol use: Never    Drug use: Never    Sexual activity: Not Currently     Partners: Male       Current Outpatient Medications:     aspirin 81 MG chewable tablet, Chew 1 tablet Daily., Disp: , Rfl:     atorvastatin (LIPITOR) 80 MG tablet, Take 1 tablet by mouth Every Night., Disp: 90 tablet, Rfl: 0    calcium carbonate EX (Tums Smoothies) 750 MG chewable tablet, Chew 1 tablet Daily., Disp: , Rfl:     ergocalciferol (ERGOCALCIFEROL) 1.25 MG (48756 UT) capsule, Take 1 capsule by mouth. On fridays, Disp: , Rfl:     latanoprost (XALATAN) 0.005 % ophthalmic solution, Administer 1 drop to both eyes Every Night., Disp: , Rfl:     omeprazole (priLOSEC) 40 MG capsule, Take 1 capsule by mouth Daily., Disp: , Rfl:     timolol (TIMOPTIC) 0.5 % ophthalmic solution, Administer 1 drop to both eyes Daily. IN the morning, Disp: , Rfl:    Allergies   Allergen Reactions    Brimonidine Other (See Comments)     Ophthalmic solution \" caused olfactory sensitivity\"    Metoprolol Other (See Comments)     Kidney Damage    " "    Objective     Physical Exam:  Vitals:    12/03/24 0856   BP: 124/68   Pulse: 68   Temp: 97.9 °F (36.6 °C)   SpO2: 100%   Weight: 45.3 kg (99 lb 14.4 oz)   Height: 152.4 cm (60\")      Body mass index is 19.51 kg/m².     Physical Exam:  General Appearance: Alert  Eyes: Anicteric sclera  HEENT: no scleral injection   Lungs: respirations appear comfortable, no obvious increased work of breathing  Extremities: No cyanosis or fingernail clubbing   Skin: No rashes in exposed skin areas     Neurological Examination:   Mental status: Alert and oriented to person, place, and time. Speech with no dysarthria, able to name and repeat with no difficulty.    Cranial Nerves: Visual fields intact. Extraocular movements are intact with no nystagmus. Facial sensation intact. Face symmetrical. Hearing grossly intact. Palate movement is symmetric. Full shoulder shrug bilaterally. Tongue protrudes midline.   Sensory: Sensory exam to light touch in all four extremities distally is normal. Double simultaneous sensory stimulation shows no extinction  Motor: Normal tone throughout. Normal bulk. Pronator drift is absent.  Left upper extremity: 5/5 deltoid, tricep, bicep, interosseous, hand .   Right upper extremity: 5/5 deltoid, tricep, bicep, interosseous, hand .   Left lower extremity: 5/5 iliopsoas, knee extension/flexion, foot dorsi/plantarflexion.  Right lower extremity: 5/5 iliopsoas, knee extension/flexion, foot dorsi/plantarflexion.  Cerebellar: Finger-to-nose intact. Heel-to-shin intact. Rapid alternating movements are intact.   Gait: Normal.    NIHSS:     1a  Level of consciousness: 0=alert; keenly responsive   1b. LOC questions:  0=Performs both tasks correctly   1c. LOC commands: 0=Answers both questions correctly   2.  Best Gaze: 0=normal   3.  Visual: 1=Left Right upper quadranopsia   4. Facial Palsy: 0=Normal symmetric movement   5a.  Motor left arm: 0=No drift, limb holds 90 (or 45) degrees for full 10 seconds "   5b.  Motor right arm: 0=No drift, limb holds 90 (or 45) degrees for full 10 seconds   6a. motor left le=No drift, limb holds 90 (or 45) degrees for full 10 seconds   6b  Motor right le=No drift, limb holds 90 (or 45) degrees for full 10 seconds   7. Limb Ataxia: 0=Absent   8.  Sensory: 0=Normal; no sensory loss   9. Best Language:  0=No aphasia, normal   10. Dysarthria: 0=Normal   11. Extinction and Inattention: 0=No abnormality     Modified Uvalde Score based on in-office assessment of alertness, orientation, and physical mobility. Further assessment with neurocognitive testing may be needed to elucidate full extent of cognitive abilities: 0 = No Symptoms       PHQ-9 Depression Screening  Little interest or pleasure in doing things? Not at all   Feeling down, depressed, or hopeless? Not at all   PHQ-2 Total Score 0   Trouble falling or staying asleep, or sleeping too much?     Feeling tired or having little energy?     Poor appetite or overeating?     Feeling bad about yourself - or that you are a failure or have let yourself or your family down?     Trouble concentrating on things, such as reading the newspaper or watching television?     Moving or speaking so slowly that other people could have noticed? Or the opposite - being so fidgety or restless that you have been moving around a lot more than usual?     Thoughts that you would be better off dead, or of hurting yourself in some way?     PHQ-9 Total Score     If you checked off any problems, how difficult have these problems made it for you to do your work, take care of things at home, or get along with other people?          ITA Fall Risk Clinician Key Questions   Have you fallen in the past year?: Yes  Stay Idependant Patient Questions   Patient Fall Risk Assessment Score : 0  Fall Risk Category  Fall Risk Category: Moderate      Laboratory Results:   Hemoglobin   Date Value Ref Range Status   2024 11.7 (L) 12.0 - 15.9 g/dL Final    05/05/2024 8.9 (L) 11.2 - 15.7 g/dL Final     Hematocrit   Date Value Ref Range Status   07/29/2024 36.2 34.0 - 46.6 % Final   05/05/2024 27.5 (L) 34.0 - 45.0 % Final     Platelets   Date Value Ref Range Status   07/29/2024 182 140 - 450 10*3/mm3 Final   05/05/2024 162 155 - 369 10*3/uL Final     Hemoglobin A1C   Date Value Ref Range Status   07/28/2024 5.50 4.80 - 5.60 % Final   10/12/2021 5.5 <5.7 % Final     LDL Cholesterol    Date Value Ref Range Status   07/28/2024 113 (H) 0 - 100 mg/dL Final   06/15/2021 93 <100 mg/dL Final     Comment:     LDL Cholesterol Reference Range (age >17 years):  Optimal:  <100 mg/dL  Near or above optional: 100 - 129 mg/dL  Borderline high: 130 - 159 mg/dL  High: 160 - 189 mg/dL  Very high: >189 mg/dL      LDL Cholesterol Reference Range (age <18 years):  Desirable:     <110 mg/dL   Borderline:    110 - 129 mg/dL   Undesirable:   >130 mg/dL     AST (SGOT)   Date Value Ref Range Status   04/05/2022 24 9 - 36 U/L Final     ALT (SGPT)   Date Value Ref Range Status   04/05/2022 10 8 - 33 U/L Final         Assessment / Plan      Assessment and Plan  There are no diagnoses linked to this encounter.    History of left occipital ischemic stroke.  -Continue 81 mg of aspirin   -Continue 80 mg of Atorvastatin, once daily.    -Do not take aspirin as an analgesic/pain relief while taking a daily low-dose aspirin for primary stroke prevention.    -Consider taking Tylenol, if appropriate.    -Consider contacting your primary care physician regarding pain.  -Chair Yoga for balance exercise 30 minutes 3 - 4 times a day  -Heart and Brain Healthy diet, Consider a Mediterranean Diet.   -Journal BP at home and call PCP with persistent BP >140/90  -Follow up in 6 months  -Goal to decrease LDL to less than 70.  -Stroke risk factors: Hypertension, hyperlipidemia,   -The patient was advised to follow up with her primary care physician for ongoing management and screening for hypertension,  "hypercholesterolemia, and diabetes.   -The patient was counseled on long-term blood pressure goal less than 120/80, long-term LDL goal less than 70, and long-term hemoglobin A1c goal less than 7.0% for stroke prevention. This was also printed for the patient in the after visit summary.  -The patient was counseled she experiences symptoms of acute onset unilateral arm, leg, or face weakness/numbness/tingling, unilateral facial droop, slurred speech/word finding difficulty, visual disturbance (\"curtain falling\" or visual field loss), or severe headache she should call 911 and present to the nearest emergency department immediately.    I spent 30 minutes caring for Wendi on this date of service. This time includes time spent by me in the following activities:reviewing tests, performing a medically appropriate examination and/or evaluation , counseling and educating the patient/family/caregiver, ordering medications, tests, or procedures, and documenting information in the medical record    Follow Up  Return in about 1 year (around 12/3/2025).    Patient or patient representative verbalized consent for the use of Ambient Listening during the visit with  DAVID Garcia for chart documentation. 12/3/2024  14:09 EST    12/3/2024  13:12 EST        DAVID Garcia  [facility] [place] NEURO STROKE     Part of this note may be an electronic transcription/translation of spoken language to printed text using the Dragon Dictation System.  "

## 2025-06-03 ENCOUNTER — OFFICE VISIT (OUTPATIENT)
Dept: NEUROLOGY | Facility: CLINIC | Age: OVER 89
End: 2025-06-03
Payer: MEDICARE

## 2025-06-03 VITALS
DIASTOLIC BLOOD PRESSURE: 66 MMHG | OXYGEN SATURATION: 97 % | TEMPERATURE: 98.1 F | BODY MASS INDEX: 19.44 KG/M2 | HEART RATE: 78 BPM | SYSTOLIC BLOOD PRESSURE: 112 MMHG | WEIGHT: 99 LBS | HEIGHT: 60 IN

## 2025-06-03 DIAGNOSIS — Z00.00 ROUTINE ADULT HEALTH MAINTENANCE: ICD-10-CM

## 2025-06-03 DIAGNOSIS — E78.49 OTHER HYPERLIPIDEMIA: Primary | ICD-10-CM

## 2025-06-03 RX ORDER — FLUOROURACIL 50 MG/G
CREAM TOPICAL
COMMUNITY
Start: 2025-02-07

## 2025-06-03 NOTE — PROGRESS NOTES
"    Follow Up Office Visit      Encounter Date: 2025   Patient Name: Wendi Ravi  : 1933   MRN: [id number]   PCP: Emily Smith MD    Chief Complaint: follow up for management of stroke and TIA      History of Present Illness: Wendi Ravi is a 89 y.o. female who is here today to establish care.  Pertinent medical history includes HTN, HLD, anemia, vitamin D deficiency, recent right hip fracture (May, 2024) who was admitted to [facility] 2024 from an outside hospital following an episode of dizziness, blurred vision and left leg weakness.  Initial NIHSS reported as a 3, all symptoms resolved shortly thereafter though it was noted that she had continued right superior quadrantanopsia on exam.  CT head was negative for acute abnormality.  CTA head and neck were negative for significant stenosis, no LVO or aneurysm.  MRI was negative for acute infarct.  There was evidence of a chronic left occipital stroke, no evidence of hemorrhage.  Patient was not aware of any prior stroke history.  TTE showed EF of 60%, normal left atrium.  Symptoms were attributed to a TIA secondary to small vessel disease.  Patient was on aspirin 81 mg prior to this event she was discharged home on aspirin 325 mg as well as atorvastatin     Clinic visit 2024: Patient presents today accompanied by a family member.  She tells me she has been doing well; denies any unilateral weakness, no numbness and tingling, no speech disturbance.  She notes that since she was in the hospital she has had some difficulty with her right upper visual field, no new visual changes.  She also notes that she has been feeling, \"foggy headed,\" since her hospitalization she specifies that it is not a headache, dizziness, or difficulty concentrating.  She does describe having a episode a few days ago of dizziness, lightheadedness, and bilateral lower extremity weakness and generalized weakness.  This persisted for a short time before completely " resolving at which point she noted that the fogginess seemed to have lifted.  She checks her blood pressure daily, reports systolics 120-150.  She has completed PT/OT.  She has remained compliant with aspirin 325 mg and asks if she could exchange this for Tylenol; we reviewed the antiplatelet effects of aspirin and that these are not interchangeable medications.  She has been compliant with atorvastatin.    Clinic visit 6/3/2025: The patient presents for routine follow up. She is accompanied by her son. She is walking with a walker. She reports no recent falls. She reports no current complaints or issues. She experiences difficulty with her vision, specifically an inability to perceive the lower part of her visual field, which she attributes to a previous stroke. This impairment does not appear to have worsened over time, as she is able to compensate by standing up to see objects located below her line of sight. She has not had any new neurologic episodes since last year. She does not experience any facial drooping or numbness in her face, arms, or legs. Her blood pressure has been well-controlled since her stroke, and she is no longer on antihypertensive medication. She reports experiencing general weakness in her legs, which is equal on both sides and necessitates the use of a walker for mobility.        Subjective        I have reviewed and the following portions of the patient's history were updated as appropriate: past family history, past medical history, past social history, past surgical history and problem list.    Medications:     Current Outpatient Medications:     aspirin 81 MG chewable tablet, Chew 1 tablet Daily., Disp: , Rfl:     atorvastatin (LIPITOR) 80 MG tablet, Take 1 tablet by mouth Every Night., Disp: 90 tablet, Rfl: 0    calcium carbonate EX (Tums Smoothies) 750 MG chewable tablet, Chew 1 tablet Daily., Disp: , Rfl:     fluorouracil (EFUDEX) 5 % cream, , Disp: , Rfl:     latanoprost (XALATAN)  "0.005 % ophthalmic solution, Administer 1 drop to both eyes Every Night., Disp: , Rfl:     omeprazole (priLOSEC) 40 MG capsule, Take 1 capsule by mouth Daily., Disp: , Rfl:     timolol (TIMOPTIC) 0.5 % ophthalmic solution, Administer 1 drop to both eyes Daily. IN the morning, Disp: , Rfl:     Allergies:   Allergies   Allergen Reactions    Brimonidine Other (See Comments)     Ophthalmic solution \" caused olfactory sensitivity\"    Metoprolol Other (See Comments)     Kidney Damage       Objective     Physical Exam:   Vital Signs:   Vitals:    06/03/25 0852   BP: 112/66   Pulse: 78   Temp: 98.1 °F (36.7 °C)   SpO2: 97%   Weight: 44.9 kg (99 lb)   Height: 152.4 cm (60\")     Body mass index is 19.33 kg/m².    Physical Exam  Vitals and nursing note reviewed.   Constitutional:       General: She is awake. She is not in acute distress.     Appearance: Normal appearance. She is normal weight. She is not ill-appearing.      Comments: 89 year old  female   HENT:      Head: Normocephalic and atraumatic.      Nose: Nose normal.      Mouth/Throat:      Mouth: Mucous membranes are moist.   Eyes:      General: Lids are normal.      Extraocular Movements: Extraocular movements intact.      Pupils: Pupils are equal, round, and reactive to light.   Cardiovascular:      Rate and Rhythm: Normal rate and regular rhythm.      Pulses: Normal pulses.   Pulmonary:      Effort: Pulmonary effort is normal. No respiratory distress.   Skin:     General: Skin is warm and dry.   Neurological:      Mental Status: She is alert and oriented to person, place, and time.      Cranial Nerves: Cranial nerve deficit present.      Sensory: No sensory deficit.      Motor: Weakness (generalized) present.      Coordination: Coordination normal.      Gait: Gait abnormal.   Psychiatric:         Mood and Affect: Mood normal.         Speech: Speech normal.         Behavior: Behavior normal.       Neurological Exam  Mental Status  Awake and alert. Oriented " to person, place, time and situation. Oriented to person, place, and time. Speech is normal. Language is fluent with no aphasia. Attention and concentration are normal. Fund of knowledge is appropriate for level of education.    Cranial Nerves  CN II: Right visual acuity: Counts fingers. Left visual acuity: Counts fingers. Right superior quadrantanopsia..  CN III, IV, VI: Extraocular movements intact bilaterally. Normal lids and orbits bilaterally. Pupils equal round and reactive to light bilaterally.  CN V: Facial sensation is normal.  CN VII: Full and symmetric facial movement.  CN VIII: Hearing is normal to speech .  CN XI: Shoulder shrug strength is normal.  CN XII: Tongue midline without atrophy or fasciculations.    Motor  Decreased muscle bulk throughout. No fasciculations present. Normal muscle tone. Strength is 5/5 in all four extremities except as noted.  Generalized weakness, 4+/5 through out with no drift .    Sensory  Light touch is normal in upper and lower extremities.     Coordination  Right: Finger-to-nose normal.Left: Finger-to-nose normal.  No obvious dysmetria .    Gait   Abnormal gait.Casual gait: Reduced stride length.  Walking with a walker .       Modified Cathryn Score: 2        0  No Symptoms    1 No significant disability. Able to carry out all usual activities, despite some symptoms.    2 Slight disability. Able to look after own affairs without assistance, but unable to carry out all previous activities.    3 Moderate disability. Requires some help, but able to walk unassisted.    4 Moderately severe disability. Unable to attend to own bodily needs without assistance, and unable to walk unassisted.    5 Severe disability. Requires constant nursing care and attention, bedridden, incontinent.    6 Dead      PHQ-9 Depression Screening  Little interest or pleasure in doing things? Not at all   Feeling down, depressed, or hopeless? Not at all   PHQ-2 Total Score 0   Trouble falling or staying  asleep, or sleeping too much?     Feeling tired or having little energy?     Poor appetite or overeating?     Feeling bad about yourself - or that you are a failure or have let yourself or your family down?     Trouble concentrating on things, such as reading the newspaper or watching television?     Moving or speaking so slowly that other people could have noticed? Or the opposite - being so fidgety or restless that you have been moving around a lot more than usual?       Thoughts that you would be better off dead, or of hurting yourself in some way?     PHQ-9 Total Score     If you checked off any problems, how difficult have these problems made it for you to do your work, take care of things at home, or get along with other people?           ITA Fall Risk Clinician Key Questions   Have you fallen in the past year?: No  Do you feel unsteady with walking?: No  Are you worried about falling?: No      Hemoglobin   Date Value Ref Range Status   07/29/2024 11.7 (L) 12.0 - 15.9 g/dL Final   05/05/2024 8.9 (L) 11.2 - 15.7 g/dL Final     Hematocrit   Date Value Ref Range Status   07/29/2024 36.2 34.0 - 46.6 % Final   05/05/2024 27.5 (L) 34.0 - 45.0 % Final     Platelets   Date Value Ref Range Status   07/29/2024 182 140 - 450 10*3/mm3 Final   05/05/2024 162 155 - 369 10*3/uL Final     Hemoglobin A1C   Date Value Ref Range Status   07/28/2024 5.50 4.80 - 5.60 % Final   10/12/2021 5.5 <5.7 % Final     LDL Cholesterol    Date Value Ref Range Status   07/28/2024 113 (H) 0 - 100 mg/dL Final   06/15/2021 93 <100 mg/dL Final     Comment:     LDL Cholesterol Reference Range (age >17 years):  Optimal:  <100 mg/dL  Near or above optional: 100 - 129 mg/dL  Borderline high: 130 - 159 mg/dL  High: 160 - 189 mg/dL  Very high: >189 mg/dL      LDL Cholesterol Reference Range (age <18 years):  Desirable:     <110 mg/dL   Borderline:    110 - 129 mg/dL   Undesirable:   >130 mg/dL     AST (SGOT)   Date Value Ref Range Status   04/05/2022  24 9 - 36 U/L Final     ALT (SGPT)   Date Value Ref Range Status   04/05/2022 10 8 - 33 U/L Final          Assessment / Plan      Assessment/Plan:   Diagnoses and all orders for this visit:     # History of stroke (left occipital, chronic appearing on MRI not previously identified) patient has ongoing right superior quadrantanopsia  # History of TIA; 7/28/2024 (transient ischemic attack)  #HLD  #HTN  -Etiology likely small vessel disease  -Continue aspirin 81 mg daily   -Continue Lipitor 80 mg nightly. , goal <70  -BP goals <140/90. Avoid hypotension. Patient reports that she has not had any difficulty with her blood pressure since last year and is currently not requiring antihypertensive medications.   -Falls precautions  -Reviewed reduction strategies of modifiable stroke risk factors  -Follow up in stroke clinic in one year. Call sooner with questions or concerns.         Discussed the importance of medication compliance Aspirin 81 mg daily and Atorvastatin 80mg nightly and lifestyle modifications adequate control of blood pressure, adequate control of cholesterol (goal LDL <70), increased physical activity, and implementation of healthy diet to help reduce the risk of future cerebrovascular events.  Also discussed the signs symptoms that would warrant the patient return back to the emergency department including unilateral weakness, unilateral numbness, visual disturbances, loss of balance, speech difficulties, and/or a sudden severe headache.  Patient verbalized understanding.    Follow Up:   Return in about 1 year (around 6/3/2026).    Patient or patient representative verbalized consent for the use of Ambient Listening during the visit with  DAVID Levy for chart documentation. 6/3/2025  09:34 EDT      DAVID Levy  [facility] [place] Neuro Stroke